# Patient Record
Sex: MALE | Race: WHITE | NOT HISPANIC OR LATINO | Employment: OTHER | URBAN - NONMETROPOLITAN AREA
[De-identification: names, ages, dates, MRNs, and addresses within clinical notes are randomized per-mention and may not be internally consistent; named-entity substitution may affect disease eponyms.]

---

## 2017-01-06 ENCOUNTER — OFFICE VISIT (OUTPATIENT)
Dept: CARDIOLOGY | Facility: PHYSICIAN GROUP | Age: 82
End: 2017-01-06
Payer: MEDICARE

## 2017-01-06 VITALS
DIASTOLIC BLOOD PRESSURE: 72 MMHG | SYSTOLIC BLOOD PRESSURE: 140 MMHG | BODY MASS INDEX: 31.28 KG/M2 | HEART RATE: 67 BPM | WEIGHT: 218 LBS | OXYGEN SATURATION: 94 %

## 2017-01-06 DIAGNOSIS — Z95.2 S/P AORTIC VALVE REPLACEMENT: ICD-10-CM

## 2017-01-06 DIAGNOSIS — I50.42 CHRONIC COMBINED SYSTOLIC AND DIASTOLIC HEART FAILURE (HCC): ICD-10-CM

## 2017-01-06 DIAGNOSIS — I48.92 PAROXYSMAL ATRIAL FLUTTER (HCC): ICD-10-CM

## 2017-01-06 PROCEDURE — G8599 NO ASA/ANTIPLAT THER USE RNG: HCPCS | Performed by: INTERNAL MEDICINE

## 2017-01-06 PROCEDURE — 99213 OFFICE O/P EST LOW 20 MIN: CPT | Performed by: INTERNAL MEDICINE

## 2017-01-06 PROCEDURE — 1036F TOBACCO NON-USER: CPT | Performed by: INTERNAL MEDICINE

## 2017-01-06 PROCEDURE — G8419 CALC BMI OUT NRM PARAM NOF/U: HCPCS | Performed by: INTERNAL MEDICINE

## 2017-01-06 PROCEDURE — G8427 DOCREV CUR MEDS BY ELIG CLIN: HCPCS | Performed by: INTERNAL MEDICINE

## 2017-01-06 ASSESSMENT — ENCOUNTER SYMPTOMS
MYALGIAS: 0
COUGH: 0
PND: 0
ORTHOPNEA: 0
FALLS: 0
WHEEZING: 0

## 2017-01-06 NOTE — PROGRESS NOTES
Subjective:   Dayne Franco is a 83 y.o. male who presents today for follow-up of his aortic valve disease.  Cardiac status is clinically stable since last interval.  No chest pain, palpitations, orthopnea, edema, syncope, or near-syncope.  Exertional capacity has been stable.  No interval change otherwise.  Past Medical History   Diagnosis Date   • PVD (peripheral vascular disease) (HCC)    • DVT      right   • Anemia, unspecified      associated with chronic elevation of sed rate, possibly due to the chronic inflamation of the R leg   • Aortic valve disorders      S/P AVR    • Atrial fibrillation (HCC)      S/P maze, period of SR post op then persistent atrial flutter.    • Atrial flutter (HCC)      persistent after maze procedure with spontaneous reversion to sinus rhythm with atypical P waves.   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   • Pure hypercholesterolemia    • Cellulitis and abscess of unspecified site      R leg post trauma and on chronic antibiotic supression     Past Surgical History   Procedure Laterality Date   • Aortic valve replacement  6/3/08     Performed by GHASSAN CLIFTON at SURGERY [facility] ORS   • Maze procedure  6/3/08     Performed by GHASSAN CLIFTON at SURGERY [facility] ORS   • Mass excision general  6/3/08     Performed by GHASSAN CLIFTON at SURGERY [facility] ORS   • Aortic valve replacement       bovine pericardial   • Foot surgery       Family History   Problem Relation Age of Onset   • Other Neg Hx      History   Smoking status   • Former Smoker   Smokeless tobacco   • Never Used     Allergies   Allergen Reactions   • Aggrenox [Aspirin-Dipyridamole]    • Plavix [Clopidogrel Bisulfate]      Outpatient Encounter Prescriptions as of 1/6/2017   Medication Sig Dispense Refill   • Misc Natural Products (GLUCOSAMINE CHOND COMPLEX/MSM PO) Take  by mouth.     • cefdinir (OMNICEF) 300 MG CAPS Take 300 mg by mouth every day.     • Nattokinase 100 MG CAPS  Take  by mouth every day.     • Multiple Vitamins-Minerals (OCUVITE) TABS Take 2 Tabs by mouth 2 times a day.     • docosahexanoic acid (OMEGA 3 FA) 1000 MG CAPS Take 1,000 mg by mouth 2 Times a Day.       No facility-administered encounter medications on file as of 1/6/2017.     Review of Systems   Respiratory: Negative for cough and wheezing.    Cardiovascular: Negative for orthopnea and PND.   Musculoskeletal: Negative for myalgias and falls.        Objective:   /72 mmHg  Pulse 67  Wt 98.884 kg (218 lb)  SpO2 94%    Physical Exam   Constitutional: He is oriented to person, place, and time. He appears well-developed and well-nourished.   Younger for his age.    Eyes: Conjunctivae are normal. No scleral icterus.   Neck: No JVD present.   Cardiovascular: Normal rate, regular rhythm, S1 normal and S2 normal.  Exam reveals no gallop.    Murmur (2/6 luis, left sternal border) heard.  Pulmonary/Chest: Effort normal and breath sounds normal.   Healed sternotomy   Musculoskeletal: He exhibits edema (1+ bilateral with support stockings on).   Neurological: He is alert and oriented to person, place, and time.   Skin: Skin is warm and dry.   Psychiatric: He has a normal mood and affect. Thought content normal.       Assessment:     1. S/P aortic valve replacement     2. Chronic combined systolic and diastolic heart failure (HCC)     3. Paroxysmal atrial flutter (HCC)        he is maintaining sinus rhythm or ectopic atrial rhythm post-maze. His congestive failure is now basically resolved and requires no regular therapy.   Medical Decision Making:  Today's Assessment / Status / Plan:     Continue the current cardiovascular regimen.  Continue primary follow up with Dr. Ortega  Cardiology follow up in  6 month(s) and  sooner if needed for any change.   Lab before next visit.  Use of the emergency medical system reviewed for any new symptoms.

## 2017-01-06 NOTE — Clinical Note
[facility] [address]  Phone: [phone]  Fax: [phone]              Dayne Franco  4/5/1933    Encounter Date: 1/6/2017    Jax Collins M.D.          PROGRESS NOTE:  Subjective:   Dayne Franco is a 83 y.o. male who presents today for follow-up of his aortic valve disease.  Cardiac status is clinically stable since last interval.  No chest pain, palpitations, orthopnea, edema, syncope, or near-syncope.  Exertional capacity has been stable.  No interval change otherwise.  Past Medical History   Diagnosis Date   • PVD (peripheral vascular disease) (HCC)    • DVT      right   • Anemia, unspecified      associated with chronic elevation of sed rate, possibly due to the chronic inflamation of the R leg   • Aortic valve disorders      S/P AVR    • Atrial fibrillation (HCC)      S/P maze, period of SR post op then persistent atrial flutter.    • Atrial flutter (HCC)      persistent after maze procedure with spontaneous reversion to sinus rhythm with atypical P waves.   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   • Pure hypercholesterolemia    • Cellulitis and abscess of unspecified site      R leg post trauma and on chronic antibiotic supression     Past Surgical History   Procedure Laterality Date   • Aortic valve replacement  6/3/08     Performed by GHASSAN CLIFTON at SURGERY [facility] ORS   • Maze procedure  6/3/08     Performed by GHASSAN CLIFTON at SURGERY [facility] ORS   • Mass excision general  6/3/08     Performed by GHASSAN CLIFTON at SURGERY [facility] ORS   • Aortic valve replacement       bovine pericardial   • Foot surgery       Family History   Problem Relation Age of Onset   • Other Neg Hx      History   Smoking status   • Former Smoker   Smokeless tobacco   • Never Used     Allergies   Allergen Reactions   • Aggrenox [Aspirin-Dipyridamole]    • Plavix [Clopidogrel  Bisulfate]      Outpatient Encounter Prescriptions as of 1/6/2017   Medication Sig Dispense Refill   • Misc Natural Products (GLUCOSAMINE CHOND COMPLEX/MSM PO) Take  by mouth.     • cefdinir (OMNICEF) 300 MG CAPS Take 300 mg by mouth every day.     • Nattokinase 100 MG CAPS Take  by mouth every day.     • Multiple Vitamins-Minerals (OCUVITE) TABS Take 2 Tabs by mouth 2 times a day.     • docosahexanoic acid (OMEGA 3 FA) 1000 MG CAPS Take 1,000 mg by mouth 2 Times a Day.       No facility-administered encounter medications on file as of 1/6/2017.     Review of Systems   Respiratory: Negative for cough and wheezing.    Cardiovascular: Negative for orthopnea and PND.   Musculoskeletal: Negative for myalgias and falls.        Objective:   /72 mmHg  Pulse 67  Wt 98.884 kg (218 lb)  SpO2 94%    Physical Exam   Constitutional: He is oriented to person, place, and time. He appears well-developed and well-nourished.   Younger for his age.    Eyes: Conjunctivae are normal. No scleral icterus.   Neck: No JVD present.   Cardiovascular: Normal rate, regular rhythm, S1 normal and S2 normal.  Exam reveals no gallop.    Murmur (2/6 luis, left sternal border) heard.  Pulmonary/Chest: Effort normal and breath sounds normal.   Healed sternotomy   Musculoskeletal: He exhibits edema (1+ bilateral with support stockings on).   Neurological: He is alert and oriented to person, place, and time.   Skin: Skin is warm and dry.   Psychiatric: He has a normal mood and affect. Thought content normal.       Assessment:     1. S/P aortic valve replacement     2. Chronic combined systolic and diastolic heart failure (HCC)     3. Paroxysmal atrial flutter (HCC)        he is maintaining sinus rhythm or ectopic atrial rhythm post-maze. His congestive failure is now basically resolved and requires no regular therapy.   Medical Decision Making:  Today's Assessment / Status / Plan:     Continue the current cardiovascular regimen.  Continue  primary follow up with Dr. Ortega  Cardiology follow up in  6 month(s) and  sooner if needed for any change.   Lab before next visit.  Use of the emergency medical system reviewed for any new symptoms.       No Recipients

## 2017-07-11 ENCOUNTER — OFFICE VISIT (OUTPATIENT)
Dept: CARDIOLOGY | Facility: PHYSICIAN GROUP | Age: 82
End: 2017-07-11
Payer: MEDICARE

## 2017-07-11 VITALS
SYSTOLIC BLOOD PRESSURE: 130 MMHG | BODY MASS INDEX: 30.92 KG/M2 | HEART RATE: 60 BPM | HEIGHT: 70 IN | WEIGHT: 216 LBS | DIASTOLIC BLOOD PRESSURE: 70 MMHG

## 2017-07-11 DIAGNOSIS — D64.9 ANEMIA, UNSPECIFIED TYPE: ICD-10-CM

## 2017-07-11 DIAGNOSIS — Z95.2 S/P AORTIC VALVE REPLACEMENT: ICD-10-CM

## 2017-07-11 DIAGNOSIS — I48.92 PAROXYSMAL ATRIAL FLUTTER (HCC): ICD-10-CM

## 2017-07-11 DIAGNOSIS — E78.00 PURE HYPERCHOLESTEROLEMIA: ICD-10-CM

## 2017-07-11 DIAGNOSIS — I50.42 CHRONIC COMBINED SYSTOLIC AND DIASTOLIC HEART FAILURE (HCC): ICD-10-CM

## 2017-07-11 PROCEDURE — 99214 OFFICE O/P EST MOD 30 MIN: CPT | Performed by: INTERNAL MEDICINE

## 2017-07-11 ASSESSMENT — ENCOUNTER SYMPTOMS
WHEEZING: 0
COUGH: 0
CHILLS: 0
FALLS: 0
WEAKNESS: 0
MYALGIAS: 0
BRUISES/BLEEDS EASILY: 0
PND: 0
FEVER: 0
ORTHOPNEA: 0

## 2017-07-11 ASSESSMENT — LIFESTYLE VARIABLES: SUBSTANCE_ABUSE: 0

## 2017-07-11 NOTE — PROGRESS NOTES
Subjective:   Dayne Franco is a 84 y.o. male who presents today for follow-up of valvular heart disease. Over the past several weeks he has noted more shortness of breath and lower extremity edema than usual area he has not had chest pain nor any palpitations nor syncope nor near syncope. The shortness of breath occurs with effort.    Past Medical History   Diagnosis Date   • PVD (peripheral vascular disease) (CMS-HCC)    • DVT      right   • Anemia, unspecified      associated with chronic elevation of sed rate, possibly due to the chronic inflamation of the R leg   • Aortic valve disorders      S/P AVR    • Atrial fibrillation (CMS-HCC)      S/P maze, period of SR post op then persistent atrial flutter.    • Atrial flutter (CMS-HCC)      persistent after maze procedure with spontaneous reversion to sinus rhythm with atypical P waves.   • Postmyocardial infarction syndrome (CMS-HCC)      Dressler's post op AVR   • Pure hypercholesterolemia    • Cellulitis and abscess of unspecified site      R leg post trauma and on chronic antibiotic supression     Past Surgical History   Procedure Laterality Date   • Aortic valve replacement  6/3/08     Performed by GHASSAN CLIFTON at SURGERY [facility] ORS   • Maze procedure  6/3/08     Performed by GHASSAN CLIFTON at SURGERY [facility] ORS   • Mass excision general  6/3/08     Performed by GHASSAN CLIFTON at SURGERY [facility] ORS   • Aortic valve replacement       bovine pericardial   • Foot surgery       Family History   Problem Relation Age of Onset   • Other Neg Hx      History   Smoking status   • Former Smoker   Smokeless tobacco   • Never Used     Allergies   Allergen Reactions   • Aggrenox [Aspirin-Dipyridamole]    • Plavix [Clopidogrel Bisulfate]      Outpatient Encounter Prescriptions as of 7/11/2017   Medication Sig Dispense Refill   • Misc Natural Products (GLUCOSAMINE CHOND COMPLEX/MSM PO) Take  by mouth.     • Multiple Vitamins-Minerals (OCUVITE) TABS Take  "2 Tabs by mouth 2 times a day.     • docosahexanoic acid (OMEGA 3 FA) 1000 MG CAPS Take 1,000 mg by mouth 2 Times a Day.     • cefdinir (OMNICEF) 300 MG CAPS Take 300 mg by mouth every day.     • Nattokinase 100 MG CAPS Take  by mouth every day.       No facility-administered encounter medications on file as of 7/11/2017.     Review of Systems   Constitutional: Negative for fever, chills and malaise/fatigue.   HENT: Negative for nosebleeds.    Respiratory: Negative for cough and wheezing.    Cardiovascular: Negative for orthopnea and PND.   Musculoskeletal: Negative for myalgias and falls.   Neurological: Negative for weakness.   Endo/Heme/Allergies: Does not bruise/bleed easily.   Psychiatric/Behavioral: Negative for substance abuse.        Objective:   /70 mmHg  Pulse 60  Ht 1.778 m (5' 10\")  Wt 97.977 kg (216 lb)  BMI 30.99 kg/m2    Physical Exam   Constitutional: He is oriented to person, place, and time. He appears well-developed and well-nourished.   Younger for his age.    Eyes: Conjunctivae are normal. No scleral icterus.   Neck: No JVD present.   Cardiovascular: Normal rate, regular rhythm, S1 normal and S2 normal.  Exam reveals no gallop.    Murmur (2/6 luis, left sternal border, 2 over 4 and diastolic blowing murmur which is new ) heard.  Pulmonary/Chest: Effort normal and breath sounds normal.   Healed sternotomy   Musculoskeletal: He exhibits edema (2+ bilateral with support stockings on and they may be a little stretched out and are certainly not tight).   Neurological: He is alert and oriented to person, place, and time.   Skin: Skin is warm and dry.   Psychiatric: He has a normal mood and affect. Thought content normal.       Assessment:     1. S/P aortic valve replacement  ECHOCARDIOGRAM COMP W/O CONT   2. Chronic combined systolic and diastolic heart failure (CMS-HCC)  BTYPE NATRIURETIC PEPTIDE   3. Paroxysmal atrial flutter (CMS-HCC)     4. Pure hypercholesterolemia  LIPID PROFILE    COMP " METABOLIC PANEL    TSH   5. Anemia, unspecified type  CBC WITH DIFFERENTIAL     He has had chronic lymphedema in his support stockings are not as well fitting as they have been because they are getting old but the dyspnea suggest that this may be congestive failure and the diastolic murmur is certainly clear today.  Medical Decision Making:  Today's Assessment / Status / Plan:   Echo and lab now in follow-up visit early next week in immediately if dyspnea worsens. His daughter is with him and she is very clear on the use of the emergency medical system as well.  He is reluctant to add medication but that is almost certainly going to be required.

## 2017-07-11 NOTE — Clinical Note
[facility] [address]  Phone: [phone]  Fax: [phone]              Dayne Franco  4/5/1933    Encounter Date: 7/11/2017    Jax Collins M.D.          PROGRESS NOTE:  Subjective:   Dayne Franco is a 84 y.o. male who presents today for follow-up of valvular heart disease. Over the past several weeks he has noted more shortness of breath and lower extremity edema than usual area he has not had chest pain nor any palpitations nor syncope nor near syncope. The shortness of breath occurs with effort.    Past Medical History   Diagnosis Date   • PVD (peripheral vascular disease) (CMS-HCC)    • DVT      right   • Anemia, unspecified      associated with chronic elevation of sed rate, possibly due to the chronic inflamation of the R leg   • Aortic valve disorders      S/P AVR    • Atrial fibrillation (CMS-HCC)      S/P maze, period of SR post op then persistent atrial flutter.    • Atrial flutter (CMS-HCC)      persistent after maze procedure with spontaneous reversion to sinus rhythm with atypical P waves.   • Postmyocardial infarction syndrome (CMS-HCC)      Dressler's post op AVR   • Pure hypercholesterolemia    • Cellulitis and abscess of unspecified site      R leg post trauma and on chronic antibiotic supression     Past Surgical History   Procedure Laterality Date   • Aortic valve replacement  6/3/08     Performed by GHASSAN CLIFTON at SURGERY [facility] ORS   • Maze procedure  6/3/08     Performed by GHASSAN CLIFTON at SURGERY [facility] ORS   • Mass excision general  6/3/08     Performed by GHASSAN CLIFTON at SURGERY [facility] ORS   • Aortic valve replacement       bovine pericardial   • Foot surgery       Family History   Problem Relation Age of Onset   • Other Neg Hx      History   Smoking status   • Former Smoker   Smokeless tobacco   • Never Used     Allergies   Allergen Reactions   • Aggrenox  "[Aspirin-Dipyridamole]    • Plavix [Clopidogrel Bisulfate]      Outpatient Encounter Prescriptions as of 7/11/2017   Medication Sig Dispense Refill   • Misc Natural Products (GLUCOSAMINE CHOND COMPLEX/MSM PO) Take  by mouth.     • Multiple Vitamins-Minerals (OCUVITE) TABS Take 2 Tabs by mouth 2 times a day.     • docosahexanoic acid (OMEGA 3 FA) 1000 MG CAPS Take 1,000 mg by mouth 2 Times a Day.     • cefdinir (OMNICEF) 300 MG CAPS Take 300 mg by mouth every day.     • Nattokinase 100 MG CAPS Take  by mouth every day.       No facility-administered encounter medications on file as of 7/11/2017.     Review of Systems   Constitutional: Negative for fever, chills and malaise/fatigue.   HENT: Negative for nosebleeds.    Respiratory: Negative for cough and wheezing.    Cardiovascular: Negative for orthopnea and PND.   Musculoskeletal: Negative for myalgias and falls.   Neurological: Negative for weakness.   Endo/Heme/Allergies: Does not bruise/bleed easily.   Psychiatric/Behavioral: Negative for substance abuse.        Objective:   /70 mmHg  Pulse 60  Ht 1.778 m (5' 10\")  Wt 97.977 kg (216 lb)  BMI 30.99 kg/m2    Physical Exam   Constitutional: He is oriented to person, place, and time. He appears well-developed and well-nourished.   Younger for his age.    Eyes: Conjunctivae are normal. No scleral icterus.   Neck: No JVD present.   Cardiovascular: Normal rate, regular rhythm, S1 normal and S2 normal.  Exam reveals no gallop.    Murmur (2/6 luis, left sternal border, 2 over 4 and diastolic blowing murmur which is new ) heard.  Pulmonary/Chest: Effort normal and breath sounds normal.   Healed sternotomy   Musculoskeletal: He exhibits edema (2+ bilateral with support stockings on and they may be a little stretched out and are certainly not tight).   Neurological: He is alert and oriented to person, place, and time.   Skin: Skin is warm and dry.   Psychiatric: He has a normal mood and affect. Thought content " normal.       Assessment:     1. S/P aortic valve replacement  ECHOCARDIOGRAM COMP W/O CONT   2. Chronic combined systolic and diastolic heart failure (CMS-HCC)  BTYPE NATRIURETIC PEPTIDE   3. Paroxysmal atrial flutter (CMS-HCC)     4. Pure hypercholesterolemia  LIPID PROFILE    COMP METABOLIC PANEL    TSH   5. Anemia, unspecified type  CBC WITH DIFFERENTIAL     He has had chronic lymphedema in his support stockings are not as well fitting as they have been because they are getting old but the dyspnea suggest that this may be congestive failure and the diastolic murmur is certainly clear today.  Medical Decision Making:  Today's Assessment / Status / Plan:   Echo and lab now in follow-up visit early next week in immediately if dyspnea worsens. His daughter is with him and she is very clear on the use of the emergency medical system as well.  He is reluctant to add medication but that is almost certainly going to be required.        No Recipients

## 2017-07-19 DIAGNOSIS — D64.9 ANEMIA, UNSPECIFIED TYPE: ICD-10-CM

## 2017-07-19 DIAGNOSIS — E78.00 PURE HYPERCHOLESTEROLEMIA: ICD-10-CM

## 2017-07-19 DIAGNOSIS — I50.42 CHRONIC COMBINED SYSTOLIC AND DIASTOLIC HEART FAILURE (HCC): ICD-10-CM

## 2017-07-27 ENCOUNTER — OFFICE VISIT (OUTPATIENT)
Dept: CARDIOLOGY | Facility: PHYSICIAN GROUP | Age: 82
End: 2017-07-27
Payer: MEDICARE

## 2017-07-27 VITALS
OXYGEN SATURATION: 93 % | WEIGHT: 212 LBS | SYSTOLIC BLOOD PRESSURE: 102 MMHG | HEART RATE: 56 BPM | BODY MASS INDEX: 30.35 KG/M2 | HEIGHT: 70 IN | DIASTOLIC BLOOD PRESSURE: 60 MMHG

## 2017-07-27 DIAGNOSIS — Z95.2 S/P AORTIC VALVE REPLACEMENT: ICD-10-CM

## 2017-07-27 DIAGNOSIS — I50.42 CHRONIC COMBINED SYSTOLIC AND DIASTOLIC HEART FAILURE (HCC): ICD-10-CM

## 2017-07-27 DIAGNOSIS — I48.92 PAROXYSMAL ATRIAL FLUTTER (HCC): ICD-10-CM

## 2017-07-27 DIAGNOSIS — E78.00 PURE HYPERCHOLESTEROLEMIA: ICD-10-CM

## 2017-07-27 PROCEDURE — 93000 ELECTROCARDIOGRAM COMPLETE: CPT | Performed by: INTERNAL MEDICINE

## 2017-07-27 PROCEDURE — 99213 OFFICE O/P EST LOW 20 MIN: CPT | Performed by: INTERNAL MEDICINE

## 2017-07-27 ASSESSMENT — ENCOUNTER SYMPTOMS
FALLS: 0
ORTHOPNEA: 0
PND: 0
MYALGIAS: 0
COUGH: 0
WHEEZING: 0

## 2017-07-27 NOTE — PROGRESS NOTES
Subjective:   Dayne Franco is a 84 y.o. male who presents today for follow-up of his valvular heart disease. He has felt well with no dyspnea and no syncope nor near syncope nor congestive symptoms. The echocardiogram showed gradual progression of valvular dysfunction but his left and his systolic function is actually better than previously. His rhythm throughout the entire echocardiogram appeared to be junctional with no atrial activity whatsoever.    Past Medical History   Diagnosis Date   • PVD (peripheral vascular disease) (CMS-HCC)    • DVT      right   • Anemia, unspecified      associated with chronic elevation of sed rate, possibly due to the chronic inflamation of the R leg   • Aortic valve disorders      S/P AVR    • Atrial fibrillation (CMS-HCC)      S/P maze, period of SR post op then persistent atrial flutter.    • Atrial flutter (CMS-HCC)      persistent after maze procedure with spontaneous reversion to sinus rhythm with atypical P waves.   • Postmyocardial infarction syndrome (CMS-HCC)      Dressler's post op AVR   • Pure hypercholesterolemia    • Cellulitis and abscess of unspecified site      R leg post trauma and on chronic antibiotic supression     Past Surgical History   Procedure Laterality Date   • Aortic valve replacement  6/3/08     Performed by GHASSAN CLIFTON at SURGERY [facility] ORS   • Maze procedure  6/3/08     Performed by GHASSAN CLIFTON at SURGERY [facility] ORS   • Mass excision general  6/3/08     Performed by GHASSAN CLIFTON at SURGERY [facility] ORS   • Aortic valve replacement       bovine pericardial   • Foot surgery       Family History   Problem Relation Age of Onset   • Other Neg Hx      History   Smoking status   • Former Smoker   Smokeless tobacco   • Never Used     Allergies   Allergen Reactions   • Aggrenox [Aspirin-Dipyridamole]    • Plavix [Clopidogrel Bisulfate]      Outpatient Encounter Prescriptions as of 7/27/2017   Medication Sig Dispense Refill   • Misc  "Natural Products (GLUCOSAMINE CHOND COMPLEX/MSM PO) Take  by mouth.     • Multiple Vitamins-Minerals (OCUVITE) TABS Take 2 Tabs by mouth 2 times a day.     • docosahexanoic acid (OMEGA 3 FA) 1000 MG CAPS Take 1,000 mg by mouth 2 Times a Day.     • cefdinir (OMNICEF) 300 MG CAPS Take 300 mg by mouth every day.     • Nattokinase 100 MG CAPS Take  by mouth every day.       No facility-administered encounter medications on file as of 7/27/2017.     Review of Systems   Respiratory: Negative for cough and wheezing.    Cardiovascular: Positive for leg swelling (his chronic lymphedema is unchanged). Negative for orthopnea and PND.   Musculoskeletal: Negative for myalgias and falls.        Objective:   /60 mmHg  Pulse 56  Ht 1.778 m (5' 10\")  Wt 96.163 kg (212 lb)  BMI 30.42 kg/m2  SpO2 93%    Physical Exam   Constitutional: He is oriented to person, place, and time. He appears well-developed and well-nourished.   Young for age.    Eyes: Conjunctivae are normal. No scleral icterus.   Neck: No JVD present.   Cardiovascular: Normal rate, regular rhythm, S1 normal and S2 normal.  Exam reveals no gallop.    Murmur (2/6 luis, left sternal border and  2/4 diastolic blowing murmur as noted last time) heard.  Pulmonary/Chest: Effort normal and breath sounds normal.   Healed sternotomy   Musculoskeletal: He exhibits edema (2+ bilateral with support stockings on and they may be a little stretched out and are certainly not tight).   Neurological: He is alert and oriented to person, place, and time.   Skin: Skin is warm and dry.   Psychiatric: He has a normal mood and affect. Thought content normal.       Assessment:     1. S/P aortic valve replacement     2. Chronic combined systolic and diastolic heart failure (CMS-HCC)     3. Paroxysmal atrial flutter (CMS-HCC)  EKG   4. Pure hypercholesterolemia       We discussed the findings today. He would not want to consider any sort of invasive evaluation and the echo actually " "supports this. His rhythm is junctional with possible very low amplitude slow flutter as the atrial background please had no syncope nor near syncope and is does not want to consider a pacemaker.  Medical Decision Making:  Today's Assessment / Status / Plan:   He and his daughter and I had a good discussion today and he is extremely better than he was last time. He understands that his five-year mortality risk is high to which he responded \"good.\"  I will see him in 4 months and I made no changes today. Immediate reevaluation if symptoms.    "

## 2017-08-02 DIAGNOSIS — Z95.2 S/P AORTIC VALVE REPLACEMENT: ICD-10-CM

## 2017-11-30 ENCOUNTER — OFFICE VISIT (OUTPATIENT)
Dept: CARDIOLOGY | Facility: PHYSICIAN GROUP | Age: 82
End: 2017-11-30
Payer: MEDICARE

## 2017-11-30 VITALS
SYSTOLIC BLOOD PRESSURE: 136 MMHG | DIASTOLIC BLOOD PRESSURE: 86 MMHG | OXYGEN SATURATION: 94 % | BODY MASS INDEX: 30.49 KG/M2 | HEART RATE: 64 BPM | HEIGHT: 70 IN | WEIGHT: 213 LBS

## 2017-11-30 DIAGNOSIS — I34.2 NON-RHEUMATIC MITRAL VALVE STENOSIS: ICD-10-CM

## 2017-11-30 DIAGNOSIS — I48.92 PAROXYSMAL ATRIAL FLUTTER (HCC): ICD-10-CM

## 2017-11-30 DIAGNOSIS — I50.30 (HFPEF) HEART FAILURE WITH PRESERVED EJECTION FRACTION (HCC): ICD-10-CM

## 2017-11-30 DIAGNOSIS — Z95.2 S/P AORTIC VALVE REPLACEMENT: ICD-10-CM

## 2017-11-30 DIAGNOSIS — I49.8 JUNCTIONAL RHYTHM: ICD-10-CM

## 2017-11-30 PROCEDURE — 99213 OFFICE O/P EST LOW 20 MIN: CPT | Performed by: INTERNAL MEDICINE

## 2017-11-30 ASSESSMENT — ENCOUNTER SYMPTOMS
FALLS: 0
WHEEZING: 0
ORTHOPNEA: 0
PND: 0
MYALGIAS: 0
NEUROLOGICAL NEGATIVE: 1
COUGH: 0
BRUISES/BLEEDS EASILY: 0

## 2017-11-30 ASSESSMENT — LIFESTYLE VARIABLES: SUBSTANCE_ABUSE: 0

## 2017-11-30 NOTE — LETTER
[facility] [address]  Phone: [phone]  Fax: [phone]              Dayne Franco  4/5/1933    Encounter Date: 11/30/2017    Jax Collins M.D.          PROGRESS NOTE:  Subjective:   Dayne Franco is a 84 y.o. male who presents today For follow-up visit of his valvular heart disease and atrial arrhythmias. He has clinically been doing extremely well with no cardiovascular symptoms. He has had no bleeding problems. He has had no syncope nor near syncope.  Past Medical History:   Diagnosis Date   • Anemia, unspecified     associated with chronic elevation of sed rate, possibly due to the chronic inflamation of the R leg   • Aortic valve disorders     S/P AVR    • Atrial fibrillation (CMS-HCC)     S/P maze, period of SR post op then persistent atrial flutter.    • Atrial flutter (CMS-HCC)     persistent after maze procedure with spontaneous reversion to sinus rhythm with atypical P waves.   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   • Pure hypercholesterolemia    • PVD (peripheral vascular disease) (CMS-HCC)      Past Surgical History:   Procedure Laterality Date   • AORTIC VALVE REPLACEMENT  6/3/08    Performed by GHASSAN CLIFTON at [facility]ERY [facility]S   • MAZE PROCEDURE  6/3/08    Performed by GHASSAN CLIFTON at [facility]ERY [facility]   • MASS EXCISION GENERAL  6/3/08    Performed by GHASSAN CLIFTON at [facility]ERY [facility]   • AORTIC VALVE REPLACEMENT      bovine pericardial   • FOOT SURGERY       Family History   Problem Relation Age of Onset   • Other Neg Hx      History   Smoking Status   • Former Smoker   Smokeless Tobacco   • Never  "Used     Allergies   Allergen Reactions   • Aggrenox [Aspirin-Dipyridamole]    • Plavix [Clopidogrel Bisulfate]      Outpatient Encounter Prescriptions as of 11/30/2017   Medication Sig Dispense Refill   • Misc Natural Products (GLUCOSAMINE CHOND COMPLEX/MSM PO) Take  by mouth.     • Multiple Vitamins-Minerals (OCUVITE) TABS Take 2 Tabs by mouth 2 times a day.     • docosahexanoic acid (OMEGA 3 FA) 1000 MG CAPS Take 1,000 mg by mouth 2 Times a Day.     • cefdinir (OMNICEF) 300 MG CAPS Take 300 mg by mouth every day.     • Nattokinase 100 MG CAPS Take  by mouth every day.       No facility-administered encounter medications on file as of 11/30/2017.      Review of Systems   HENT: Negative for nosebleeds.    Respiratory: Negative for cough and wheezing.    Cardiovascular: Negative for orthopnea and PND.   Musculoskeletal: Negative for falls and myalgias.   Neurological: Negative.    Endo/Heme/Allergies: Does not bruise/bleed easily.   Psychiatric/Behavioral: Negative for substance abuse.        Objective:   /86   Pulse 64   Ht 1.778 m (5' 10\")   Wt 96.6 kg (213 lb)   SpO2 94%   BMI 30.56 kg/m²      Physical Exam   Constitutional: He is oriented to person, place, and time. He appears well-developed and well-nourished.   Young for age.    Eyes: Conjunctivae are normal. No scleral icterus.   Neck: No JVD present.   Cardiovascular: Normal rate, regular rhythm, S1 normal and S2 normal.  Exam reveals no gallop.    Murmur (2/6 luis, left sternal border and  2/4 diastolic blowing murmur unchanged) heard.  Pulmonary/Chest: Effort normal and breath sounds normal.   Healed sternotomy   Musculoskeletal: He exhibits edema (2+ bilateral with support stockings ).   Neurological: He is alert and oriented to person, place, and time.   Skin: Skin is warm and dry.   Psychiatric: He has a normal mood and affect. Thought content normal.     His rhythm is clock regular today and I did not repeat an electrocardiogram today but is " probably still low amplitude atonic atrial activity of some type with a regular junctional rhythm which remains asymptomatic. The echo is also scanned. Left ventricular systolic function has improved.  Assessment:     1. S/P aortic valve replacement     2. (HFpEF) heart failure with preserved ejection fraction (CMS-HCC)     3. Junctional rhythm     4. Paroxysmal atrial flutter (CMS-HCC)     5. Non-rheumatic mitral valve stenosis       His strategy of nattokinase as opposed to Coumadin has been remarkably successful over the past 9 years. He is strongly opposed to Coumadin and his valvular heart disease mandates Coumadin as opposed to the NOACs.  His diastolic heart failure is remarkably well compensated and his ejection fraction was improved. Clinical status is otherwise stable.      Medical Decision Making:  Today's Assessment / Status / Plan:   Continue the current cardiovascular regimen.  Continue primary follow up with  Dr. Ortega.   Cardiology follow up in 3 months and  sooner if needed for any change.   Lab after next visit.  Use of the emergency medical system reviewed.       Abhijit Ortega M.D.  [address] [place] [id number]  VIA Facsimile: [phone]

## 2017-11-30 NOTE — PROGRESS NOTES
Subjective:   Dayne Franco is a 84 y.o. male who presents today For follow-up visit of his valvular heart disease and atrial arrhythmias. He has clinically been doing extremely well with no cardiovascular symptoms. He has had no bleeding problems. He has had no syncope nor near syncope.  Past Medical History:   Diagnosis Date   • Anemia, unspecified     associated with chronic elevation of sed rate, possibly due to the chronic inflamation of the R leg   • Aortic valve disorders     S/P AVR    • Atrial fibrillation (CMS-HCC)     S/P maze, period of SR post op then persistent atrial flutter.    • Atrial flutter (CMS-HCC)     persistent after maze procedure with spontaneous reversion to sinus rhythm with atypical P waves.   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   • Pure hypercholesterolemia    • PVD (peripheral vascular disease) (CMS-HCC)      Past Surgical History:   Procedure Laterality Date   • AORTIC VALVE REPLACEMENT  6/3/08    Performed by GHASSAN CLIFTON at SURGERY [facility]   • MAZE PROCEDURE  6/3/08    Performed by GHASSAN CLIFTON at SURGERY [facility]   • MASS EXCISION GENERAL  6/3/08    Performed by GHASSAN CLIFTON at SURGERY [facility]   • AORTIC VALVE REPLACEMENT      bovine pericardial   • FOOT SURGERY       Family History   Problem Relation Age of Onset   • Other Neg Hx      History   Smoking Status   • Former Smoker   Smokeless Tobacco   • Never Used     Allergies   Allergen Reactions   • Aggrenox [Aspirin-Dipyridamole]    • Plavix [Clopidogrel Bisulfate]      Outpatient Encounter Prescriptions as of 11/30/2017   Medication Sig Dispense Refill   • Misc Natural Products (GLUCOSAMINE CHOND COMPLEX/MSM PO) Take  by  "mouth.     • Multiple Vitamins-Minerals (OCUVITE) TABS Take 2 Tabs by mouth 2 times a day.     • docosahexanoic acid (OMEGA 3 FA) 1000 MG CAPS Take 1,000 mg by mouth 2 Times a Day.     • cefdinir (OMNICEF) 300 MG CAPS Take 300 mg by mouth every day.     • Nattokinase 100 MG CAPS Take  by mouth every day.       No facility-administered encounter medications on file as of 11/30/2017.      Review of Systems   HENT: Negative for nosebleeds.    Respiratory: Negative for cough and wheezing.    Cardiovascular: Negative for orthopnea and PND.   Musculoskeletal: Negative for falls and myalgias.   Neurological: Negative.    Endo/Heme/Allergies: Does not bruise/bleed easily.   Psychiatric/Behavioral: Negative for substance abuse.        Objective:   /86   Pulse 64   Ht 1.778 m (5' 10\")   Wt 96.6 kg (213 lb)   SpO2 94%   BMI 30.56 kg/m²     Physical Exam   Constitutional: He is oriented to person, place, and time. He appears well-developed and well-nourished.   Young for age.    Eyes: Conjunctivae are normal. No scleral icterus.   Neck: No JVD present.   Cardiovascular: Normal rate, regular rhythm, S1 normal and S2 normal.  Exam reveals no gallop.    Murmur (2/6 luis, left sternal border and  2/4 diastolic blowing murmur unchanged) heard.  Pulmonary/Chest: Effort normal and breath sounds normal.   Healed sternotomy   Musculoskeletal: He exhibits edema (2+ bilateral with support stockings ).   Neurological: He is alert and oriented to person, place, and time.   Skin: Skin is warm and dry.   Psychiatric: He has a normal mood and affect. Thought content normal.     His rhythm is clock regular today and I did not repeat an electrocardiogram today but is probably still low amplitude atonic atrial activity of some type with a regular junctional rhythm which remains asymptomatic. The echo is also scanned. Left ventricular systolic function has improved.  Assessment:     1. S/P aortic valve replacement     2. (HFpEF) heart " failure with preserved ejection fraction (CMS-HCC)     3. Junctional rhythm     4. Paroxysmal atrial flutter (CMS-HCC)     5. Non-rheumatic mitral valve stenosis       His strategy of nattokinase as opposed to Coumadin has been remarkably successful over the past 9 years. He is strongly opposed to Coumadin and his valvular heart disease mandates Coumadin as opposed to the NOACs.  His diastolic heart failure is remarkably well compensated and his ejection fraction was improved. Clinical status is otherwise stable.      Medical Decision Making:  Today's Assessment / Status / Plan:   Continue the current cardiovascular regimen.  Continue primary follow up with  Dr. Ortega.   Cardiology follow up in 3 months and  sooner if needed for any change.   Lab after next visit.  Use of the emergency medical system reviewed.

## 2017-12-19 ENCOUNTER — APPOINTMENT (RX ONLY)
Dept: URBAN - NONMETROPOLITAN AREA CLINIC 15 | Facility: CLINIC | Age: 82
Setting detail: DERMATOLOGY
End: 2017-12-19

## 2017-12-19 DIAGNOSIS — L81.4 OTHER MELANIN HYPERPIGMENTATION: ICD-10-CM

## 2017-12-19 DIAGNOSIS — L72.8 OTHER FOLLICULAR CYSTS OF THE SKIN AND SUBCUTANEOUS TISSUE: ICD-10-CM

## 2017-12-19 DIAGNOSIS — L57.0 ACTINIC KERATOSIS: ICD-10-CM

## 2017-12-19 DIAGNOSIS — L82.1 OTHER SEBORRHEIC KERATOSIS: ICD-10-CM

## 2017-12-19 PROBLEM — D48.5 NEOPLASM OF UNCERTAIN BEHAVIOR OF SKIN: Status: ACTIVE | Noted: 2017-12-19

## 2017-12-19 PROCEDURE — ? BIOPSY BY SHAVE METHOD

## 2017-12-19 PROCEDURE — 17003 DESTRUCT PREMALG LES 2-14: CPT

## 2017-12-19 PROCEDURE — 17000 DESTRUCT PREMALG LESION: CPT

## 2017-12-19 PROCEDURE — 11101: CPT

## 2017-12-19 PROCEDURE — ? BIOPSY BY PUNCH METHOD

## 2017-12-19 PROCEDURE — ? LIQUID NITROGEN

## 2017-12-19 PROCEDURE — 11100: CPT | Mod: 59

## 2017-12-19 PROCEDURE — 99202 OFFICE O/P NEW SF 15 MIN: CPT | Mod: 25

## 2017-12-19 PROCEDURE — ? COUNSELING

## 2017-12-19 ASSESSMENT — LOCATION DETAILED DESCRIPTION DERM
LOCATION DETAILED: LEFT SUPERIOR LATERAL MALAR CHEEK
LOCATION DETAILED: RIGHT CENTRAL ZYGOMA
LOCATION DETAILED: LEFT RADIAL DORSAL HAND
LOCATION DETAILED: LEFT SUPERIOR PARIETAL SCALP
LOCATION DETAILED: LEFT CENTRAL PARIETAL SCALP
LOCATION DETAILED: LEFT MEDIAL MALAR CHEEK
LOCATION DETAILED: LEFT SUPERIOR MEDIAL FOREHEAD
LOCATION DETAILED: LEFT SUPERIOR OCCIPITAL SCALP
LOCATION DETAILED: RIGHT CENTRAL LATERAL NECK
LOCATION DETAILED: LEFT SUPERIOR FOREHEAD
LOCATION DETAILED: LEFT SUPERIOR ANTERIOR NECK
LOCATION DETAILED: RIGHT FOREHEAD
LOCATION DETAILED: LEFT MEDIAL TEMPLE
LOCATION DETAILED: LEFT FOREHEAD
LOCATION DETAILED: LEFT LATERAL FOREHEAD
LOCATION DETAILED: RIGHT CLAVICULAR NECK
LOCATION DETAILED: LEFT MEDIAL FRONTAL SCALP

## 2017-12-19 ASSESSMENT — LOCATION SIMPLE DESCRIPTION DERM
LOCATION SIMPLE: LEFT SCALP
LOCATION SIMPLE: NECK
LOCATION SIMPLE: RIGHT FOREHEAD
LOCATION SIMPLE: LEFT HAND
LOCATION SIMPLE: LEFT FOREHEAD
LOCATION SIMPLE: SCALP
LOCATION SIMPLE: LEFT TEMPLE
LOCATION SIMPLE: LEFT ANTERIOR NECK
LOCATION SIMPLE: LEFT CHEEK
LOCATION SIMPLE: LEFT OCCIPITAL SCALP
LOCATION SIMPLE: RIGHT ANTERIOR NECK
LOCATION SIMPLE: RIGHT ZYGOMA

## 2017-12-19 ASSESSMENT — LOCATION ZONE DERM
LOCATION ZONE: HAND
LOCATION ZONE: NECK
LOCATION ZONE: FACE
LOCATION ZONE: SCALP

## 2017-12-19 NOTE — PROCEDURE: BIOPSY BY SHAVE METHOD
Curettage Text: The wound bed was treated with curettage after the biopsy was performed.
Lab Facility: 
Biopsy Type: H and E
Detail Level: Detailed
Dressing: bandage
Hemostasis: Electrocautery
Wound Care: Vaseline
Consent: Written consent was obtained and risks were reviewed including but not limited to scarring, infection, bleeding, scabbing, incomplete removal, nerve damage and allergy to anesthesia.
Bill For Surgical Tray: no
Post-Care Instructions: I reviewed with the patient in detail post-care instructions. Patient is to keep the biopsy site dry overnight, and then apply bacitracin twice daily until healed. Patient may apply hydrogen peroxide soaks to remove any crusting.
Electrodesiccation Text: The wound bed was treated with electrodesiccation after the biopsy was performed.
Size Of Lesion In Cm: 1.3
Cryotherapy Text: The wound bed was treated with cryotherapy after the biopsy was performed.
Type Of Destruction Used: Electrodesiccation
Silver Nitrate Text: The wound bed was treated with silver nitrate after the biopsy was performed.
Anesthesia Volume In Cc: 0.5
Additional Anesthesia Volume In Cc (Will Not Render If 0): 0
Notification Instructions: Patient will be notified of biopsy results. However, patient instructed to call the office if not contacted within 2 weeks.
Biopsy Method: Dermablade
Lab: [id number]
Electrodesiccation And Curettage Text: The wound bed was treated with electrodesiccation and curettage after the biopsy was performed.
Billing Type: Third-Party Bill
Anesthesia Type: 1% lidocaine with epinephrine
Size Of Lesion In Cm: 0.7

## 2017-12-19 NOTE — PROCEDURE: BIOPSY BY PUNCH METHOD
Punch Size In Mm: 4
Epidermal Sutures: 6-0 Vicryl
Render Post-Care Instructions In Note?: no
Post-Care Instructions: I reviewed with the patient in detail post-care instructions. Patient is to keep the biopsy site dry overnight, and then apply bacitracin twice daily until healed. Patient may apply hydrogen peroxide soaks to remove any crusting.
Additional Anesthesia Volume In Cc (Will Not Render If 0): 0
Wound Care: Vaseline
Hemostasis: None
Dressing: bandage
Number Of Epidermal Sutures (Optional): 5
Consent: Written consent was obtained and risks were reviewed including but not limited to scarring, infection, bleeding, scabbing, incomplete removal, nerve damage and allergy to anesthesia.
Suture Removal: 14 days
Biopsy Type: H and E
Lab: [id number]
Billing Type: Third-Party Bill
Anesthesia Volume In Cc: 0.5
Lab Facility: [id number]
Notification Instructions: Patient will be notified of biopsy results. However, patient instructed to call the office if not contacted within 2 weeks.
Patient Will Remove Sutures At Home?: Yes
X Depth Of Punch In Cm (Optional): 0.8
Detail Level: Detailed
Home Suture Removal Text: Patient was provided a home suture removal kit and will remove their sutures at home.  If they have any questions or difficulties they will call the office.
Anesthesia Type: 1% lidocaine with epinephrine
Size Of Lesion In Cm (Optional): 1.7

## 2017-12-19 NOTE — PROCEDURE: LIQUID NITROGEN
Post-Care Instructions: I reviewed with the patient in detail post-care instructions. Patient is to wear sunprotection, and avoid picking at any of the treated lesions. Pt may apply Vaseline to crusted or scabbing areas.
Render Post-Care Instructions In Note?: no
Detail Level: Detailed
Duration Of Freeze Thaw-Cycle (Seconds): 0
Consent: The patient's consent was obtained including but not limited to risks of crusting, scabbing, blistering, scarring, darker or lighter pigmentary change, recurrence, incomplete removal and infection.
Medical Necessity Clause: This procedure was medically necessary because the lesions that were treated were:
Medical Necessity Information: It is in your best interest to select a reason for this procedure from the list below. All of these items fulfill various CMS LCD requirements except the new and changing color options.

## 2018-01-17 ENCOUNTER — APPOINTMENT (RX ONLY)
Dept: URBAN - NONMETROPOLITAN AREA CLINIC 15 | Facility: CLINIC | Age: 83
Setting detail: DERMATOLOGY
End: 2018-01-17

## 2018-01-17 DIAGNOSIS — L57.0 ACTINIC KERATOSIS: ICD-10-CM

## 2018-01-17 PROBLEM — D04.39 CARCINOMA IN SITU OF SKIN OF OTHER PARTS OF FACE: Status: ACTIVE | Noted: 2018-01-17

## 2018-01-17 PROBLEM — C44.41 BASAL CELL CARCINOMA OF SKIN OF SCALP AND NECK: Status: ACTIVE | Noted: 2018-01-17

## 2018-01-17 PROCEDURE — ? EXCISION

## 2018-01-17 PROCEDURE — ? CURETTAGE AND DESTRUCTION

## 2018-01-17 PROCEDURE — 17003 DESTRUCT PREMALG LES 2-14: CPT

## 2018-01-17 PROCEDURE — 17000 DESTRUCT PREMALG LESION: CPT

## 2018-01-17 PROCEDURE — 17282 DSTR MAL LS F/E/E/N/L/M1.1-2: CPT | Mod: 59

## 2018-01-17 PROCEDURE — 11622 EXC S/N/H/F/G MAL+MRG 1.1-2: CPT | Mod: 59

## 2018-01-17 PROCEDURE — ? LIQUID NITROGEN

## 2018-01-17 ASSESSMENT — LOCATION DETAILED DESCRIPTION DERM
LOCATION DETAILED: RIGHT CENTRAL ZYGOMA
LOCATION DETAILED: RIGHT MEDIAL TEMPLE
LOCATION DETAILED: RIGHT MEDIAL ZYGOMA
LOCATION DETAILED: RIGHT INFERIOR FOREHEAD

## 2018-01-17 ASSESSMENT — LOCATION SIMPLE DESCRIPTION DERM
LOCATION SIMPLE: RIGHT ZYGOMA
LOCATION SIMPLE: RIGHT FOREHEAD
LOCATION SIMPLE: RIGHT TEMPLE

## 2018-01-17 ASSESSMENT — LOCATION ZONE DERM: LOCATION ZONE: FACE

## 2018-01-17 NOTE — PROCEDURE: CURETTAGE AND DESTRUCTION
Size Of Lesion After Curettage: 1.5
Medication Injected: 5-Fluorouracil
Detail Level: Detailed
Concentration (Mg/Ml Or Millions Of Plaque Forming Units/Cc): 0.01
Add Intralesional Injection: No
Body Location Override (Optional - Billing Will Still Be Based On Selected Body Map Location If Applicable): left superior medial malar cheek
Size Of Lesion In Cm: 1.3
Total Volume (Ccs): 1
Bill As A Line Item Or As Units: Line Item
Consent was obtained from the patient. The risks, benefits and alternatives to therapy were discussed in detail. Specifically, the risks of infection, scarring, bleeding, prolonged wound healing, nerve injury, incomplete removal, allergy to anesthesia and recurrence were addressed. Alternatives to ED&C, such as: surgical removal and XRT were also discussed.  Prior to the procedure, the treatment site was clearly identified and confirmed by the patient. All components of Universal Protocol/PAUSE Rule completed.
Anesthesia Type: 1% lidocaine with epinephrine
Additional Information: (Optional): The wound was cleaned, and a pressure dressing was applied.  The patient received detailed post-op instructions.
Cautery Type: electrodesiccation
Post-Care Instructions: I reviewed with the patient in detail post-care instructions. Patient is to keep the area dry for 48 hours, and not to engage in any swimming until the area is healed. Should the patient develop any fevers, chills, bleeding, severe pain patient will contact the office immediately.
Number Of Curettages: 3
What Was Performed First?: Curettage

## 2018-01-17 NOTE — PROCEDURE: EXCISION
Intermediate Repair Preamble Text (Leave Blank If You Do Not Want): Undermining was performed with blunt dissection.
Crescentic Complex Repair Preamble Text (Leave Blank If You Do Not Want): Extensive wide undermining was performed.
Repair Type: None
Lab: [id number]
Curettage Prior To Excision?: Yes
Secondary Defect Length (In Cm): 0
Billing Type: Third-Party Bill
Deep Sutures: 5-0 Vicryl
Additional Anesthesia Volume In Cc: 6
Graft Donor Site Bandage (Optional-Leave Blank If You Don't Want In Note): Steri-strips and a pressure bandage were applied to the donor site.
Epidermal Closure: simple interrupted
Estimated Blood Loss (Cc): minimal
Anesthesia Volume In Cc: 2
Excision Method: Saucerization
Hemostasis: Electrocautery
Dressing: dry sterile dressing
Scalpel Size: 15 blade
Pre-Excision Curettage Text (Leave Blank If You Do Not Want): Prior to drawing the surgical margin the visible lesion was removed with electrodesiccation and curettage to clearly define the lesion size.
Lab Facility: [id number]
Size Of Margin In Cm: 0.2
Excision Depth: adipose tissue
Size Of Lesion In Cm: 0.7
Bill For Surgical Tray: no
Wound Care: Petrolatum
Epidermal Sutures: 4-0 Ethilon
Detail Level: Detailed
Anesthesia Type: 1% lidocaine with epinephrine
Body Location Override (Optional - Billing Will Still Be Based On Selected Body Map Location If Applicable): left superior ant. neck
Positioning (Leave Blank If You Do Not Want): The patient was placed in a comfortable position exposing the surgical site.
Fusiform Excision Additional Text (Leave Blank If You Do Not Want): The margin was drawn around the clinically apparent lesion.  A fusiform shape was then drawn on the skin incorporating the lesion and margins.  Incisions were then made along these lines to the appropriate tissue plane and the lesion was extirpated.
Slit Excision Additional Text (Leave Blank If You Do Not Want): A linear line was drawn on the skin overlying the lesion. An incision was made slowly until the lesion was visualized.  Once visualized, the lesion was removed with blunt dissection.
Elliptical Excision Additional Text (Leave Blank If You Do Not Want): The margin was drawn around the clinically apparent lesion.  An elliptical shape was then drawn on the skin incorporating the lesion and margins.  Incisions were then made along these lines to the appropriate tissue plane and the lesion was extirpated.
Saucerization Excision Additional Text (Leave Blank If You Do Not Want): The margin was drawn around the clinically apparent lesion.  Incisions were then made along these lines, in a tangential fashion, to the appropriate tissue plane and the lesion was extirpated.
Perilesional Excision Additional Text (Leave Blank If You Do Not Want): The margin was drawn around the clinically apparent lesion. Incisions were then made along these lines to the appropriate tissue plane and the lesion was extirpated.
Excisional Biopsy Additional Text (Leave Blank If You Do Not Want): The margin was drawn around the clinically apparent lesion. An elliptical shape was then drawn on the skin incorporating the lesion and margins.  Incisions were then made along these lines to the appropriate tissue plane and the lesion was extirpated.
Curvilinear Excision Additional Text (Leave Blank If You Do Not Want): The margin was drawn around the clinically apparent lesion.  A curvilinear shape was then drawn on the skin incorporating the lesion and margins.  Incisions were then made along these lines to the appropriate tissue plane and the lesion was extirpated.
Complex Repair And Transposition Flap Text: The defect edges were debeveled with a #15 scalpel blade.  The primary defect was closed partially with a complex linear closure.  Given the location of the remaining defect, shape of the defect and the proximity to free margins a transposition flap was deemed most appropriate for complete closure of the defect.  Using a sterile surgical marker, an appropriate advancement flap was drawn incorporating the defect and placing the expected incisions within the relaxed skin tension lines where possible.    The area thus outlined was incised deep to adipose tissue with a #15 scalpel blade.  The skin margins were undermined to an appropriate distance in all directions utilizing iris scissors.
Repair Performed By Another Provider Text (Leave Blank If You Do Not Want): After the tissue was excised the defect was repaired by another provider.
Helical Rim Advancement Flap Text: The defect edges were debeveled with a #15 blade scalpel.  Given the location of the defect and the proximity to free margins (helical rim) a double helical rim advancement flap was deemed most appropriate.  Using a sterile surgical marker, the appropriate advancement flaps were drawn incorporating the defect and placing the expected incisions between the helical rim and antihelix where possible.  The area thus outlined was incised through and through with a #15 scalpel blade.  With a skin hook and iris scissors, the flaps were gently and sharply undermined and freed up.
Tissue Cultured Epidermal Autograft Text: The defect edges were debeveled with a #15 scalpel blade.  Given the location of the defect, shape of the defect and the proximity to free margins a tissue cultured epidermal autograft was deemed most appropriate.  The graft was then trimmed to fit the size of the defect.  The graft was then placed in the primary defect and oriented appropriately.
Consent was obtained from the patient. The risks and benefits to therapy were discussed in detail. Specifically, the risks of infection, scarring, bleeding, prolonged wound healing, incomplete removal, allergy to anesthesia, nerve injury and recurrence were addressed. Prior to the procedure, the treatment site was clearly identified and confirmed by the patient. All components of Universal Protocol/PAUSE Rule completed.
Posterior Auricular Interpolation Flap Text: A decision was made to reconstruct the defect utilizing an interpolation axial flap and a staged reconstruction.  A telfa template was made of the defect.  This telfa template was then used to outline the posterior auricular interpolation flap.  The donor area for the pedicle flap was then injected with anesthesia.  The flap was excised through the skin and subcutaneous tissue down to the layer of the underlying musculature.  The pedicle flap was carefully excised within this deep plane to maintain its blood supply.  The edges of the donor site were undermined.   The donor site was closed in a primary fashion.  The pedicle was then rotated into position and sutured.  Once the tube was sutured into place, adequate blood supply was confirmed with blanching and refill.  The pedicle was then wrapped with xeroform gauze and dressed appropriately with a telfa and gauze bandage to ensure continued blood supply and protect the attached pedicle.
Ear Star Wedge Flap Text: The defect edges were debeveled with a #15 blade scalpel.  Given the location of the defect and the proximity to free margins (helical rim) an ear star wedge flap was deemed most appropriate.  Using a sterile surgical marker, the appropriate flap was drawn incorporating the defect and placing the expected incisions between the helical rim and antihelix where possible.  The area thus outlined was incised through and through with a #15 scalpel blade.
Alar Island Pedicle Flap Text: The defect edges were debeveled with a #15 scalpel blade.  Given the location of the defect, shape of the defect and the proximity to the alar rim an island pedicle advancement flap was deemed most appropriate.  Using a sterile surgical marker, an appropriate advancement flap was drawn incorporating the defect, outlining the appropriate donor tissue and placing the expected incisions within the nasal ala running parallel to the alar rim. The area thus outlined was incised with a #15 scalpel blade.  The skin margins were undermined minimally to an appropriate distance in all directions around the primary defect and laterally outward around the island pedicle utilizing iris scissors.  There was minimal undermining beneath the pedicle flap.
Complex Repair And Rotation Flap Text: The defect edges were debeveled with a #15 scalpel blade.  The primary defect was closed partially with a complex linear closure.  Given the location of the remaining defect, shape of the defect and the proximity to free margins a rotation flap was deemed most appropriate for complete closure of the defect.  Using a sterile surgical marker, an appropriate advancement flap was drawn incorporating the defect and placing the expected incisions within the relaxed skin tension lines where possible.    The area thus outlined was incised deep to adipose tissue with a #15 scalpel blade.  The skin margins were undermined to an appropriate distance in all directions utilizing iris scissors.
Advancement Flap (Single) Text: The defect edges were debeveled with a #15 scalpel blade.  Given the location of the defect and the proximity to free margins a single advancement flap was deemed most appropriate.  Using a sterile surgical marker, an appropriate advancement flap was drawn incorporating the defect and placing the expected incisions within the relaxed skin tension lines where possible.    The area thus outlined was incised deep to adipose tissue with a #15 scalpel blade.  The skin margins were undermined to an appropriate distance in all directions utilizing iris scissors.
Complex Repair And Melolabial Flap Text: The defect edges were debeveled with a #15 scalpel blade.  The primary defect was closed partially with a complex linear closure.  Given the location of the remaining defect, shape of the defect and the proximity to free margins a melolabial flap was deemed most appropriate for complete closure of the defect.  Using a sterile surgical marker, an appropriate advancement flap was drawn incorporating the defect and placing the expected incisions within the relaxed skin tension lines where possible.    The area thus outlined was incised deep to adipose tissue with a #15 scalpel blade.  The skin margins were undermined to an appropriate distance in all directions utilizing iris scissors.
Island Pedicle Flap-Requiring Vessel Identification Text: The defect edges were debeveled with a #15 scalpel blade.  Given the location of the defect, shape of the defect and the proximity to free margins an island pedicle advancement flap was deemed most appropriate.  Using a sterile surgical marker, an appropriate advancement flap was drawn, based on the axial vessel mentioned above, incorporating the defect, outlining the appropriate donor tissue and placing the expected incisions within the relaxed skin tension lines where possible.    The area thus outlined was incised deep to adipose tissue with a #15 scalpel blade.  The skin margins were undermined to an appropriate distance in all directions around the primary defect and laterally outward around the island pedicle utilizing iris scissors.  There was minimal undermining beneath the pedicle flap.
V-Y Plasty Text: The defect edges were debeveled with a #15 scalpel blade.  Given the location of the defect, shape of the defect and the proximity to free margins an V-Y advancement flap was deemed most appropriate.  Using a sterile surgical marker, an appropriate advancement flap was drawn incorporating the defect and placing the expected incisions within the relaxed skin tension lines where possible.    The area thus outlined was incised deep to adipose tissue with a #15 scalpel blade.  The skin margins were undermined to an appropriate distance in all directions utilizing iris scissors.
Rhombic Flap Text: The defect edges were debeveled with a #15 scalpel blade.  Given the location of the defect and the proximity to free margins a rhombic flap was deemed most appropriate.  Using a sterile surgical marker, an appropriate rhombic flap was drawn incorporating the defect.    The area thus outlined was incised deep to adipose tissue with a #15 scalpel blade.  The skin margins were undermined to an appropriate distance in all directions utilizing iris scissors.
Home Suture Removal Text: Patient was provided a home suture removal kit and will remove their sutures at home.  If they have any questions or difficulties they will call the office.
Complex Repair And Ftsg Text: The defect edges were debeveled with a #15 scalpel blade.  The primary defect was closed partially with a complex linear closure.  Given the location of the defect, shape of the defect and the proximity to free margins a full thickness skin graft was deemed most appropriate to repair the remaining defect.  The graft was trimmed to fit the size of the remaining defect.  The graft was then placed in the primary defect, oriented appropriately, and sutured into place.
Dorsal Nasal Flap Text: The defect edges were debeveled with a #15 scalpel blade.  Given the location of the defect and the proximity to free margins a dorsal nasal flap was deemed most appropriate.  Using a sterile surgical marker, an appropriate dorsal nasal flap was drawn around the defect.    The area thus outlined was incised deep to adipose tissue with a #15 scalpel blade.  The skin margins were undermined to an appropriate distance in all directions utilizing iris scissors.
W Plasty Text: The lesion was extirpated to the level of the fat with a #15 scalpel blade.  Given the location of the defect, shape of the defect and the proximity to free margins a W-plasty was deemed most appropriate for repair.  Using a sterile surgical marker, the appropriate transposition arms of the W-plasty were drawn incorporating the defect and placing the expected incisions within the relaxed skin tension lines where possible.    The area thus outlined was incised deep to adipose tissue with a #15 scalpel blade.  The skin margins were undermined to an appropriate distance in all directions utilizing iris scissors.  The opposing transposition arms were then transposed into place in opposite direction and anchored with interrupted buried subcutaneous sutures.
Complex Repair And O-T Advancement Flap Text: The defect edges were debeveled with a #15 scalpel blade.  The primary defect was closed partially with a complex linear closure.  Given the location of the remaining defect, shape of the defect and the proximity to free margins an O-T advancement flap was deemed most appropriate for complete closure of the defect.  Using a sterile surgical marker, an appropriate advancement flap was drawn incorporating the defect and placing the expected incisions within the relaxed skin tension lines where possible.    The area thus outlined was incised deep to adipose tissue with a #15 scalpel blade.  The skin margins were undermined to an appropriate distance in all directions utilizing iris scissors.
Complex Repair And A-T Advancement Flap Text: The defect edges were debeveled with a #15 scalpel blade.  The primary defect was closed partially with a complex linear closure.  Given the location of the remaining defect, shape of the defect and the proximity to free margins an A-T advancement flap was deemed most appropriate for complete closure of the defect.  Using a sterile surgical marker, an appropriate advancement flap was drawn incorporating the defect and placing the expected incisions within the relaxed skin tension lines where possible.    The area thus outlined was incised deep to adipose tissue with a #15 scalpel blade.  The skin margins were undermined to an appropriate distance in all directions utilizing iris scissors.
Trilobed Flap Text: The defect edges were debeveled with a #15 scalpel blade.  Given the location of the defect and the proximity to free margins a trilobed flap was deemed most appropriate.  Using a sterile surgical marker, an appropriate trilobed flap drawn around the defect.    The area thus outlined was incised deep to adipose tissue with a #15 scalpel blade.  The skin margins were undermined to an appropriate distance in all directions utilizing iris scissors.
V-Y Flap Text: The defect edges were debeveled with a #15 scalpel blade.  Given the location of the defect, shape of the defect and the proximity to free margins a V-Y flap was deemed most appropriate.  Using a sterile surgical marker, an appropriate advancement flap was drawn incorporating the defect and placing the expected incisions within the relaxed skin tension lines where possible.    The area thus outlined was incised deep to adipose tissue with a #15 scalpel blade.  The skin margins were undermined to an appropriate distance in all directions utilizing iris scissors.
Split-Thickness Skin Graft Text: The defect edges were debeveled with a #15 scalpel blade.  Given the location of the defect, shape of the defect and the proximity to free margins a split thickness skin graft was deemed most appropriate.  Using a sterile surgical marker, the primary defect shape was transferred to the donor site. The split thickness graft was then harvested.  The skin graft was then placed in the primary defect and oriented appropriately.
Complex Repair And V-Y Plasty Text: The defect edges were debeveled with a #15 scalpel blade.  The primary defect was closed partially with a complex linear closure.  Given the location of the remaining defect, shape of the defect and the proximity to free margins a V-Y plasty was deemed most appropriate for complete closure of the defect.  Using a sterile surgical marker, an appropriate advancement flap was drawn incorporating the defect and placing the expected incisions within the relaxed skin tension lines where possible.    The area thus outlined was incised deep to adipose tissue with a #15 scalpel blade.  The skin margins were undermined to an appropriate distance in all directions utilizing iris scissors.
Muscle Hinge Flap Text: The defect edges were debeveled with a #15 scalpel blade.  Given the size, depth and location of the defect and the proximity to free margins a muscle hinge flap was deemed most appropriate.  Using a sterile surgical marker, an appropriate hinge flap was drawn incorporating the defect. The area thus outlined was incised with a #15 scalpel blade.  The skin margins were undermined to an appropriate distance in all directions utilizing iris scissors.
Complex Repair And Modified Advancement Flap Text: The defect edges were debeveled with a #15 scalpel blade.  The primary defect was closed partially with a complex linear closure.  Given the location of the remaining defect, shape of the defect and the proximity to free margins a modified advancement flap was deemed most appropriate for complete closure of the defect.  Using a sterile surgical marker, an appropriate advancement flap was drawn incorporating the defect and placing the expected incisions within the relaxed skin tension lines where possible.    The area thus outlined was incised deep to adipose tissue with a #15 scalpel blade.  The skin margins were undermined to an appropriate distance in all directions utilizing iris scissors.
Complex Repair And M Plasty Text: The defect edges were debeveled with a #15 scalpel blade.  The primary defect was closed partially with a complex linear closure.  Given the location of the remaining defect, shape of the defect and the proximity to free margins an M plasty was deemed most appropriate for complete closure of the defect.  Using a sterile surgical marker, an appropriate advancement flap was drawn incorporating the defect and placing the expected incisions within the relaxed skin tension lines where possible.    The area thus outlined was incised deep to adipose tissue with a #15 scalpel blade.  The skin margins were undermined to an appropriate distance in all directions utilizing iris scissors.
Xenograft Text: The defect edges were debeveled with a #15 scalpel blade.  Given the location of the defect, shape of the defect and the proximity to free margins a xenograft was deemed most appropriate.  The graft was then trimmed to fit the size of the defect.  The graft was then placed in the primary defect and oriented appropriately.
Cheek Interpolation Flap Text: A decision was made to reconstruct the defect utilizing an interpolation axial flap and a staged reconstruction.  A telfa template was made of the defect.  This telfa template was then used to outline the Cheek Interpolation flap.  The donor area for the pedicle flap was then injected with anesthesia.  The flap was excised through the skin and subcutaneous tissue down to the layer of the underlying musculature.  The interpolation flap was carefully excised within this deep plane to maintain its blood supply.  The edges of the donor site were undermined.   The donor site was closed in a primary fashion.  The pedicle was then rotated into position and sutured.  Once the tube was sutured into place, adequate blood supply was confirmed with blanching and refill.  The pedicle was then wrapped with xeroform gauze and dressed appropriately with a telfa and gauze bandage to ensure continued blood supply and protect the attached pedicle.
Complex Repair And Dermal Autograft Text: The defect edges were debeveled with a #15 scalpel blade.  The primary defect was closed partially with a complex linear closure.  Given the location of the defect, shape of the defect and the proximity to free margins an dermal autograft was deemed most appropriate to repair the remaining defect.  The graft was trimmed to fit the size of the remaining defect.  The graft was then placed in the primary defect, oriented appropriately, and sutured into place.
Complex Repair And Double Advancement Flap Text: The defect edges were debeveled with a #15 scalpel blade.  The primary defect was closed partially with a complex linear closure.  Given the location of the remaining defect, shape of the defect and the proximity to free margins a double advancement flap was deemed most appropriate for complete closure of the defect.  Using a sterile surgical marker, an appropriate advancement flap was drawn incorporating the defect and placing the expected incisions within the relaxed skin tension lines where possible.    The area thus outlined was incised deep to adipose tissue with a #15 scalpel blade.  The skin margins were undermined to an appropriate distance in all directions utilizing iris scissors.
Complex Repair And Tissue Cultured Epidermal Autograft Text: The defect edges were debeveled with a #15 scalpel blade.  The primary defect was closed partially with a complex linear closure.  Given the location of the defect, shape of the defect and the proximity to free margins an tissue cultured epidermal autograft was deemed most appropriate to repair the remaining defect.  The graft was trimmed to fit the size of the remaining defect.  The graft was then placed in the primary defect, oriented appropriately, and sutured into place.
Skin Substitute Text: The defect edges were debeveled with a #15 scalpel blade.  Given the location of the defect, shape of the defect and the proximity to free margins a skin substitute graft was deemed most appropriate.  The graft material was trimmed to fit the size of the defect. The graft was then placed in the primary defect and oriented appropriately.
Complex Repair And Dorsal Nasal Flap Text: The defect edges were debeveled with a #15 scalpel blade.  The primary defect was closed partially with a complex linear closure.  Given the location of the remaining defect, shape of the defect and the proximity to free margins a dorsal nasal flap was deemed most appropriate for complete closure of the defect.  Using a sterile surgical marker, an appropriate flap was drawn incorporating the defect and placing the expected incisions within the relaxed skin tension lines where possible.    The area thus outlined was incised deep to adipose tissue with a #15 scalpel blade.  The skin margins were undermined to an appropriate distance in all directions utilizing iris scissors.
Purse String (Simple) Text: Given the location of the defect and the characteristics of the surrounding skin a purse string simple closure was deemed most appropriate.  Undermining was performed circumferentially around the surgical defect.  A purse string suture was then placed and tightened.
Double Island Pedicle Flap Text: The defect edges were debeveled with a #15 scalpel blade.  Given the location of the defect, shape of the defect and the proximity to free margins a double island pedicle advancement flap was deemed most appropriate.  Using a sterile surgical marker, an appropriate advancement flap was drawn incorporating the defect, outlining the appropriate donor tissue and placing the expected incisions within the relaxed skin tension lines where possible.    The area thus outlined was incised deep to adipose tissue with a #15 scalpel blade.  The skin margins were undermined to an appropriate distance in all directions around the primary defect and laterally outward around the island pedicle utilizing iris scissors.  There was minimal undermining beneath the pedicle flap.
Star Wedge Flap Text: The defect edges were debeveled with a #15 scalpel blade.  Given the location of the defect, shape of the defect and the proximity to free margins a star wedge flap was deemed most appropriate.  Using a sterile surgical marker, an appropriate rotation flap was drawn incorporating the defect and placing the expected incisions within the relaxed skin tension lines where possible. The area thus outlined was incised deep to adipose tissue with a #15 scalpel blade.  The skin margins were undermined to an appropriate distance in all directions utilizing iris scissors.
Crescentic Advancement Flap Text: The defect edges were debeveled with a #15 scalpel blade.  Given the location of the defect and the proximity to free margins a crescentic advancement flap was deemed most appropriate.  Using a sterile surgical marker, the appropriate advancement flap was drawn incorporating the defect and placing the expected incisions within the relaxed skin tension lines where possible.    The area thus outlined was incised deep to adipose tissue with a #15 scalpel blade.  The skin margins were undermined to an appropriate distance in all directions utilizing iris scissors.
Bilobed Flap Text: The defect edges were debeveled with a #15 scalpel blade.  Given the location of the defect and the proximity to free margins a bilobe flap was deemed most appropriate.  Using a sterile surgical marker, an appropriate bilobe flap drawn around the defect.    The area thus outlined was incised deep to adipose tissue with a #15 scalpel blade.  The skin margins were undermined to an appropriate distance in all directions utilizing iris scissors.
Dermal Autograft Text: The defect edges were debeveled with a #15 scalpel blade.  Given the location of the defect, shape of the defect and the proximity to free margins a dermal autograft was deemed most appropriate.  Using a sterile surgical marker, the primary defect shape was transferred to the donor site. The area thus outlined was incised deep to adipose tissue with a #15 scalpel blade.  The harvested graft was then trimmed of adipose and epidermal tissue until only dermis was left.  The skin graft was then placed in the primary defect and oriented appropriately.
Complex Repair And W Plasty Text: The defect edges were debeveled with a #15 scalpel blade.  The primary defect was closed partially with a complex linear closure.  Given the location of the remaining defect, shape of the defect and the proximity to free margins a W plasty was deemed most appropriate for complete closure of the defect.  Using a sterile surgical marker, an appropriate advancement flap was drawn incorporating the defect and placing the expected incisions within the relaxed skin tension lines where possible.    The area thus outlined was incised deep to adipose tissue with a #15 scalpel blade.  The skin margins were undermined to an appropriate distance in all directions utilizing iris scissors.
Rotation Flap Text: The defect edges were debeveled with a #15 scalpel blade.  Given the location of the defect, shape of the defect and the proximity to free margins a rotation flap was deemed most appropriate.  Using a sterile surgical marker, an appropriate rotation flap was drawn incorporating the defect and placing the expected incisions within the relaxed skin tension lines where possible.    The area thus outlined was incised deep to adipose tissue with a #15 scalpel blade.  The skin margins were undermined to an appropriate distance in all directions utilizing iris scissors.
Partial Purse String (Intermediate) Text: Given the location of the defect and the characteristics of the surrounding skin an intermediate purse string closure was deemed most appropriate.  Undermining was performed circumferentially around the surgical defect.  A purse string suture was then placed and tightened. Wound tension of the circular defect prevented complete closure of the wound.
Hatchet Flap Text: The defect edges were debeveled with a #15 scalpel blade.  Given the location of the defect, shape of the defect and the proximity to free margins a hatchet flap was deemed most appropriate.  Using a sterile surgical marker, an appropriate hatchet flap was drawn incorporating the defect and placing the expected incisions within the relaxed skin tension lines where possible.    The area thus outlined was incised deep to adipose tissue with a #15 scalpel blade.  The skin margins were undermined to an appropriate distance in all directions utilizing iris scissors.
Island Pedicle Flap Text: The defect edges were debeveled with a #15 scalpel blade.  Given the location of the defect, shape of the defect and the proximity to free margins an island pedicle advancement flap was deemed most appropriate.  Using a sterile surgical marker, an appropriate advancement flap was drawn incorporating the defect, outlining the appropriate donor tissue and placing the expected incisions within the relaxed skin tension lines where possible.    The area thus outlined was incised deep to adipose tissue with a #15 scalpel blade.  The skin margins were undermined to an appropriate distance in all directions around the primary defect and laterally outward around the island pedicle utilizing iris scissors.  There was minimal undermining beneath the pedicle flap.
Z Plasty Text: The lesion was extirpated to the level of the fat with a #15 scalpel blade.  Given the location of the defect, shape of the defect and the proximity to free margins a Z-plasty was deemed most appropriate for repair.  Using a sterile surgical marker, the appropriate transposition arms of the Z-plasty were drawn incorporating the defect and placing the expected incisions within the relaxed skin tension lines where possible.    The area thus outlined was incised deep to adipose tissue with a #15 scalpel blade.  The skin margins were undermined to an appropriate distance in all directions utilizing iris scissors.  The opposing transposition arms were then transposed into place in opposite direction and anchored with interrupted buried subcutaneous sutures.
Complex Repair And Bilobe Flap Text: The defect edges were debeveled with a #15 scalpel blade.  The primary defect was closed partially with a complex linear closure.  Given the location of the remaining defect, shape of the defect and the proximity to free margins a bilobe flap was deemed most appropriate for complete closure of the defect.  Using a sterile surgical marker, an appropriate advancement flap was drawn incorporating the defect and placing the expected incisions within the relaxed skin tension lines where possible.    The area thus outlined was incised deep to adipose tissue with a #15 scalpel blade.  The skin margins were undermined to an appropriate distance in all directions utilizing iris scissors.
No Repair - Repaired With Adjacent Surgical Defect Text (Leave Blank If You Do Not Want): After the excision the defect was repaired concurrently with another surgical defect which was in close approximation.
Melolabial Interpolation Flap Text: A decision was made to reconstruct the defect utilizing an interpolation axial flap and a staged reconstruction.  A telfa template was made of the defect.  This telfa template was then used to outline the melolabial interpolation flap.  The donor area for the pedicle flap was then injected with anesthesia.  The flap was excised through the skin and subcutaneous tissue down to the layer of the underlying musculature.  The pedicle flap was carefully excised within this deep plane to maintain its blood supply.  The edges of the donor site were undermined.   The donor site was closed in a primary fashion.  The pedicle was then rotated into position and sutured.  Once the tube was sutured into place, adequate blood supply was confirmed with blanching and refill.  The pedicle was then wrapped with xeroform gauze and dressed appropriately with a telfa and gauze bandage to ensure continued blood supply and protect the attached pedicle.
Advancement Flap (Double) Text: The defect edges were debeveled with a #15 scalpel blade.  Given the location of the defect and the proximity to free margins a double advancement flap was deemed most appropriate.  Using a sterile surgical marker, the appropriate advancement flaps were drawn incorporating the defect and placing the expected incisions within the relaxed skin tension lines where possible.    The area thus outlined was incised deep to adipose tissue with a #15 scalpel blade.  The skin margins were undermined to an appropriate distance in all directions utilizing iris scissors.
Complex Repair And Z Plasty Text: The defect edges were debeveled with a #15 scalpel blade.  The primary defect was closed partially with a complex linear closure.  Given the location of the remaining defect, shape of the defect and the proximity to free margins a Z plasty was deemed most appropriate for complete closure of the defect.  Using a sterile surgical marker, an appropriate advancement flap was drawn incorporating the defect and placing the expected incisions within the relaxed skin tension lines where possible.    The area thus outlined was incised deep to adipose tissue with a #15 scalpel blade.  The skin margins were undermined to an appropriate distance in all directions utilizing iris scissors.
Cartilage Graft Text: The defect edges were debeveled with a #15 scalpel blade.  Given the location of the defect, shape of the defect, the fact the defect involved a full thickness cartilage defect a cartilage graft was deemed most appropriate.  An appropriate donor site was identified, cleansed, and anesthetized. The cartilage graft was then harvested and transferred to the recipient site, oriented appropriately and then sutured into place.  The secondary defect was then repaired using a primary closure.
Mucosal Advancement Flap Text: Given the location of the defect, shape of the defect and the proximity to free margins a mucosal advancement flap was deemed most appropriate. Incisions were made with a 15 blade scalpel in the appropriate fashion along the cutaneous vermilion border and the mucosal lip. The remaining actinically damaged mucosal tissue was excised.  The mucosal advancement flap was then elevated to the gingival sulcus with care taken to preserve the neurovascular structures and advanced into the primary defect. Care was taken to ensure that precise realignment of the vermilion border was achieved.
Cheek-To-Nose Interpolation Flap Text: A decision was made to reconstruct the defect utilizing an interpolation axial flap and a staged reconstruction.  A telfa template was made of the defect.  This telfa template was then used to outline the Cheek-To-Nose Interpolation flap.  The donor area for the pedicle flap was then injected with anesthesia.  The flap was excised through the skin and subcutaneous tissue down to the layer of the underlying musculature.  The interpolation flap was carefully excised within this deep plane to maintain its blood supply.  The edges of the donor site were undermined.   The donor site was closed in a primary fashion.  The pedicle was then rotated into position and sutured.  Once the tube was sutured into place, adequate blood supply was confirmed with blanching and refill.  The pedicle was then wrapped with xeroform gauze and dressed appropriately with a telfa and gauze bandage to ensure continued blood supply and protect the attached pedicle.
Advancement-Rotation Flap Text: The defect edges were debeveled with a #15 scalpel blade.  Given the location of the defect, shape of the defect and the proximity to free margins an advancement-rotation flap was deemed most appropriate.  Using a sterile surgical marker, an appropriate flap was drawn incorporating the defect and placing the expected incisions within the relaxed skin tension lines where possible. The area thus outlined was incised deep to adipose tissue with a #15 scalpel blade.  The skin margins were undermined to an appropriate distance in all directions utilizing iris scissors.
Bilateral Helical Rim Advancement Flap Text: The defect edges were debeveled with a #15 blade scalpel.  Given the location of the defect and the proximity to free margins (helical rim) a bilateral helical rim advancement flap was deemed most appropriate.  Using a sterile surgical marker, the appropriate advancement flaps were drawn incorporating the defect and placing the expected incisions between the helical rim and antihelix where possible.  The area thus outlined was incised through and through with a #15 scalpel blade.  With a skin hook and iris scissors, the flaps were gently and sharply undermined and freed up.
O-Z Plasty Text: The defect edges were debeveled with a #15 scalpel blade.  Given the location of the defect, shape of the defect and the proximity to free margins an O-Z plasty (double transposition flap) was deemed most appropriate.  Using a sterile surgical marker, the appropriate transposition flaps were drawn incorporating the defect and placing the expected incisions within the relaxed skin tension lines where possible.    The area thus outlined was incised deep to adipose tissue with a #15 scalpel blade.  The skin margins were undermined to an appropriate distance in all directions utilizing iris scissors.  Hemostasis was achieved with electrocautery.  The flaps were then transposed into place, one clockwise and the other counterclockwise, and anchored with interrupted buried subcutaneous sutures.
Complex Repair And O-L Flap Text: The defect edges were debeveled with a #15 scalpel blade.  The primary defect was closed partially with a complex linear closure.  Given the location of the remaining defect, shape of the defect and the proximity to free margins an O-L flap was deemed most appropriate for complete closure of the defect.  Using a sterile surgical marker, an appropriate flap was drawn incorporating the defect and placing the expected incisions within the relaxed skin tension lines where possible.    The area thus outlined was incised deep to adipose tissue with a #15 scalpel blade.  The skin margins were undermined to an appropriate distance in all directions utilizing iris scissors.
O-T Advancement Flap Text: The defect edges were debeveled with a #15 scalpel blade.  Given the location of the defect, shape of the defect and the proximity to free margins an O-T advancement flap was deemed most appropriate.  Using a sterile surgical marker, an appropriate advancement flap was drawn incorporating the defect and placing the expected incisions within the relaxed skin tension lines where possible.    The area thus outlined was incised deep to adipose tissue with a #15 scalpel blade.  The skin margins were undermined to an appropriate distance in all directions utilizing iris scissors.
Purse String (Intermediate) Text: Given the location of the defect and the characteristics of the surrounding skin a purse string intermediate closure was deemed most appropriate.  Undermining was performed circumfirentially around the surgical defect.  A purse string suture was then placed and tightened.
Path Notes (To The Dermatopathologist): Please check margins.
Lip Wedge Excision Repair Text: Given the location of the defect and the proximity to free margins a full thickness wedge repair was deemed most appropriate.  Using a sterile surgical marker, the appropriate repair was drawn incorporating the defect and placing the expected incisions perpendicular to the vermilion border.  The vermilion border was also meticulously outlined to ensure appropriate reapproximation during the repair.  The area thus outlined was incised through and through with a #15 scalpel blade.  The muscularis and dermis were reaproximated with deep sutures following hemostasis. Care was taken to realign the vermilion border before proceeding with the superficial closure.  Once the vermilion was realigned the superfical and mucosal closure was finished.
Bi-Rhombic Flap Text: The defect edges were debeveled with a #15 scalpel blade.  Given the location of the defect and the proximity to free margins a bi-rhombic flap was deemed most appropriate.  Using a sterile surgical marker, an appropriate rhombic flap was drawn incorporating the defect. The area thus outlined was incised deep to adipose tissue with a #15 scalpel blade.  The skin margins were undermined to an appropriate distance in all directions utilizing iris scissors.
Melolabial Transposition Flap Text: The defect edges were debeveled with a #15 scalpel blade.  Given the location of the defect and the proximity to free margins a melolabial flap was deemed most appropriate.  Using a sterile surgical marker, an appropriate melolabial transposition flap was drawn incorporating the defect.    The area thus outlined was incised deep to adipose tissue with a #15 scalpel blade.  The skin margins were undermined to an appropriate distance in all directions utilizing iris scissors.
Mastoid Interpolation Flap Text: A decision was made to reconstruct the defect utilizing an interpolation axial flap and a staged reconstruction.  A telfa template was made of the defect.  This telfa template was then used to outline the mastoid interpolation flap.  The donor area for the pedicle flap was then injected with anesthesia.  The flap was excised through the skin and subcutaneous tissue down to the layer of the underlying musculature.  The pedicle flap was carefully excised within this deep plane to maintain its blood supply.  The edges of the donor site were undermined.   The donor site was closed in a primary fashion.  The pedicle was then rotated into position and sutured.  Once the tube was sutured into place, adequate blood supply was confirmed with blanching and refill.  The pedicle was then wrapped with xeroform gauze and dressed appropriately with a telfa and gauze bandage to ensure continued blood supply and protect the attached pedicle.
Complex Repair And Split-Thickness Skin Graft Text: The defect edges were debeveled with a #15 scalpel blade.  The primary defect was closed partially with a complex linear closure.  Given the location of the defect, shape of the defect and the proximity to free margins a split thickness skin graft was deemed most appropriate to repair the remaining defect.  The graft was trimmed to fit the size of the remaining defect.  The graft was then placed in the primary defect, oriented appropriately, and sutured into place.
Epidermal Autograft Text: The defect edges were debeveled with a #15 scalpel blade.  Given the location of the defect, shape of the defect and the proximity to free margins an epidermal autograft was deemed most appropriate.  Using a sterile surgical marker, the primary defect shape was transferred to the donor site. The epidermal graft was then harvested.  The skin graft was then placed in the primary defect and oriented appropriately.
Interpolation Flap Text: A decision was made to reconstruct the defect utilizing an interpolation axial flap and a staged reconstruction.  A telfa template was made of the defect.  This telfa template was then used to outline the interpolation flap.  The donor area for the pedicle flap was then injected with anesthesia.  The flap was excised through the skin and subcutaneous tissue down to the layer of the underlying musculature.  The interpolation flap was carefully excised within this deep plane to maintain its blood supply.  The edges of the donor site were undermined.   The donor site was closed in a primary fashion.  The pedicle was then rotated into position and sutured.  Once the tube was sutured into place, adequate blood supply was confirmed with blanching and refill.  The pedicle was then wrapped with xeroform gauze and dressed appropriately with a telfa and gauze bandage to ensure continued blood supply and protect the attached pedicle.
O-L Flap Text: The defect edges were debeveled with a #15 scalpel blade.  Given the location of the defect, shape of the defect and the proximity to free margins an O-L flap was deemed most appropriate.  Using a sterile surgical marker, an appropriate advancement flap was drawn incorporating the defect and placing the expected incisions within the relaxed skin tension lines where possible.    The area thus outlined was incised deep to adipose tissue with a #15 scalpel blade.  The skin margins were undermined to an appropriate distance in all directions utilizing iris scissors.
Complex Repair And Epidermal Autograft Text: The defect edges were debeveled with a #15 scalpel blade.  The primary defect was closed partially with a complex linear closure.  Given the location of the defect, shape of the defect and the proximity to free margins an epidermal autograft was deemed most appropriate to repair the remaining defect.  The graft was trimmed to fit the size of the remaining defect.  The graft was then placed in the primary defect, oriented appropriately, and sutured into place.
O-T Plasty Text: The defect edges were debeveled with a #15 scalpel blade.  Given the location of the defect, shape of the defect and the proximity to free margins an O-T plasty was deemed most appropriate.  Using a sterile surgical marker, an appropriate O-T plasty was drawn incorporating the defect and placing the expected incisions within the relaxed skin tension lines where possible.    The area thus outlined was incised deep to adipose tissue with a #15 scalpel blade.  The skin margins were undermined to an appropriate distance in all directions utilizing iris scissors.
Island Pedicle Flap With Canthal Suspension Text: The defect edges were debeveled with a #15 scalpel blade.  Given the location of the defect, shape of the defect and the proximity to free margins an island pedicle advancement flap was deemed most appropriate.  Using a sterile surgical marker, an appropriate advancement flap was drawn incorporating the defect, outlining the appropriate donor tissue and placing the expected incisions within the relaxed skin tension lines where possible. The area thus outlined was incised deep to adipose tissue with a #15 scalpel blade.  The skin margins were undermined to an appropriate distance in all directions around the primary defect and laterally outward around the island pedicle utilizing iris scissors.  There was minimal undermining beneath the pedicle flap. A suspension suture was placed in the canthal tendon to prevent tension and prevent ectropion.
A-T Advancement Flap Text: The defect edges were debeveled with a #15 scalpel blade.  Given the location of the defect, shape of the defect and the proximity to free margins an A-T advancement flap was deemed most appropriate.  Using a sterile surgical marker, an appropriate advancement flap was drawn incorporating the defect and placing the expected incisions within the relaxed skin tension lines where possible.    The area thus outlined was incised deep to adipose tissue with a #15 scalpel blade.  The skin margins were undermined to an appropriate distance in all directions utilizing iris scissors.
Transposition Flap Text: The defect edges were debeveled with a #15 scalpel blade.  Given the location of the defect and the proximity to free margins a transposition flap was deemed most appropriate.  Using a sterile surgical marker, an appropriate transposition flap was drawn incorporating the defect.    The area thus outlined was incised deep to adipose tissue with a #15 scalpel blade.  The skin margins were undermined to an appropriate distance in all directions utilizing iris scissors.
S Plasty Text: Given the location and shape of the defect, and the orientation of relaxed skin tension lines, an S-plasty was deemed most appropriate for repair.  Using a sterile surgical marker, the appropriate outline of the S-plasty was drawn, incorporating the defect and placing the expected incisions within the relaxed skin tension lines where possible.  The area thus outlined was incised deep to adipose tissue with a #15 scalpel blade.  The skin margins were undermined to an appropriate distance in all directions utilizing iris scissors. The skin flaps were advanced over the defect.  The opposing margins were then approximated with interrupted buried subcutaneous sutures.
Post-Care Instructions: I reviewed with the patient in detail post-care instructions. Patient is not to engage in any heavy lifting, exercise, or swimming for the next 14 days. Should the patient develop any fevers, chills, bleeding, severe pain patient will contact the office immediately.
Complex Repair And Rhombic Flap Text: The defect edges were debeveled with a #15 scalpel blade.  The primary defect was closed partially with a complex linear closure.  Given the location of the remaining defect, shape of the defect and the proximity to free margins a rhombic flap was deemed most appropriate for complete closure of the defect.  Using a sterile surgical marker, an appropriate advancement flap was drawn incorporating the defect and placing the expected incisions within the relaxed skin tension lines where possible.    The area thus outlined was incised deep to adipose tissue with a #15 scalpel blade.  The skin margins were undermined to an appropriate distance in all directions utilizing iris scissors.
Modified Advancement Flap Text: The defect edges were debeveled with a #15 scalpel blade.  Given the location of the defect, shape of the defect and the proximity to free margins a modified advancement flap was deemed most appropriate.  Using a sterile surgical marker, an appropriate advancement flap was drawn incorporating the defect and placing the expected incisions within the relaxed skin tension lines where possible.    The area thus outlined was incised deep to adipose tissue with a #15 scalpel blade.  The skin margins were undermined to an appropriate distance in all directions utilizing iris scissors.
Complex Repair And Xenograft Text: The defect edges were debeveled with a #15 scalpel blade.  The primary defect was closed partially with a complex linear closure.  Given the location of the defect, shape of the defect and the proximity to free margins a xenograft was deemed most appropriate to repair the remaining defect.  The graft was trimmed to fit the size of the remaining defect.  The graft was then placed in the primary defect, oriented appropriately, and sutured into place.
Partial Purse String (Simple) Text: Given the location of the defect and the characteristics of the surrounding skin a simple purse string closure was deemed most appropriate.  Undermining was performed circumferentially around the surgical defect.  A purse string suture was then placed and tightened. Wound tension of the circular defect prevented complete closure of the wound.
Complex Repair And Double M Plasty Text: The defect edges were debeveled with a #15 scalpel blade.  The primary defect was closed partially with a complex linear closure.  Given the location of the remaining defect, shape of the defect and the proximity to free margins a double M plasty was deemed most appropriate for complete closure of the defect.  Using a sterile surgical marker, an appropriate advancement flap was drawn incorporating the defect and placing the expected incisions within the relaxed skin tension lines where possible.    The area thus outlined was incised deep to adipose tissue with a #15 scalpel blade.  The skin margins were undermined to an appropriate distance in all directions utilizing iris scissors.
Complex Repair And Skin Substitute Graft Text: The defect edges were debeveled with a #15 scalpel blade.  The primary defect was closed partially with a complex linear closure.  Given the location of the remaining defect, shape of the defect and the proximity to free margins a skin substitute graft was deemed most appropriate to repair the remaining defect.  The graft was trimmed to fit the size of the remaining defect.  The graft was then placed in the primary defect, oriented appropriately, and sutured into place.
Paramedian Forehead Flap Text: A decision was made to reconstruct the defect utilizing an interpolation axial flap and a staged reconstruction.  A telfa template was made of the defect.  This telfa template was then used to outline the paramedian forehead pedicle flap.  The donor area for the pedicle flap was then injected with anesthesia.  The flap was excised through the skin and subcutaneous tissue down to the layer of the underlying musculature.  The pedicle flap was carefully excised within this deep plane to maintain its blood supply.  The edges of the donor site were undermined.   The donor site was closed in a primary fashion.  The pedicle was then rotated into position and sutured.  Once the tube was sutured into place, adequate blood supply was confirmed with blanching and refill.  The pedicle was then wrapped with xeroform gauze and dressed appropriately with a telfa and gauze bandage to ensure continued blood supply and protect the attached pedicle.
Spiral Flap Text: The defect edges were debeveled with a #15 scalpel blade.  Given the location of the defect, shape of the defect and the proximity to free margins a spiral flap was deemed most appropriate.  Using a sterile surgical marker, an appropriate rotation flap was drawn incorporating the defect and placing the expected incisions within the relaxed skin tension lines where possible. The area thus outlined was incised deep to adipose tissue with a #15 scalpel blade.  The skin margins were undermined to an appropriate distance in all directions utilizing iris scissors.
Bilobed Transposition Flap Text: The defect edges were debeveled with a #15 scalpel blade.  Given the location of the defect and the proximity to free margins a bilobed transposition flap was deemed most appropriate.  Using a sterile surgical marker, an appropriate bilobe flap drawn around the defect.    The area thus outlined was incised deep to adipose tissue with a #15 scalpel blade.  The skin margins were undermined to an appropriate distance in all directions utilizing iris scissors.
H Plasty Text: Given the location of the defect, shape of the defect and the proximity to free margins a H-plasty was deemed most appropriate for repair.  Using a sterile surgical marker, the appropriate advancement arms of the H-plasty were drawn incorporating the defect and placing the expected incisions within the relaxed skin tension lines where possible. The area thus outlined was incised deep to adipose tissue with a #15 scalpel blade. The skin margins were undermined to an appropriate distance in all directions utilizing iris scissors.  The opposing advancement arms were then advanced into place in opposite direction and anchored with interrupted buried subcutaneous sutures.
Composite Graft Text: The defect edges were debeveled with a #15 scalpel blade.  Given the location of the defect, shape of the defect, the proximity to free margins and the fact the defect was full thickness a composite graft was deemed most appropriate.  The defect was outline and then transferred to the donor site.  A full thickness graft was then excised from the donor site. The graft was then placed in the primary defect, oriented appropriately and then sutured into place.  The secondary defect was then repaired using a primary closure.
Complex Repair And Single Advancement Flap Text: The defect edges were debeveled with a #15 scalpel blade.  The primary defect was closed partially with a complex linear closure.  Given the location of the remaining defect, shape of the defect and the proximity to free margins a single advancement flap was deemed most appropriate for complete closure of the defect.  Using a sterile surgical marker, an appropriate advancement flap was drawn incorporating the defect and placing the expected incisions within the relaxed skin tension lines where possible.    The area thus outlined was incised deep to adipose tissue with a #15 scalpel blade.  The skin margins were undermined to an appropriate distance in all directions utilizing iris scissors.
Keystone Flap Text: The defect edges were debeveled with a #15 scalpel blade.  Given the location of the defect, shape of the defect a keystone flap was deemed most appropriate.  Using a sterile surgical marker, an appropriate keystone flap was drawn incorporating the defect, outlining the appropriate donor tissue and placing the expected incisions within the relaxed skin tension lines where possible. The area thus outlined was incised deep to adipose tissue with a #15 scalpel blade.  The skin margins were undermined to an appropriate distance in all directions around the primary defect and laterally outward around the flap utilizing iris scissors.
Burow's Advancement Flap Text: The defect edges were debeveled with a #15 scalpel blade.  Given the location of the defect and the proximity to free margins a Burow's advancement flap was deemed most appropriate.  Using a sterile surgical marker, the appropriate advancement flap was drawn incorporating the defect and placing the expected incisions within the relaxed skin tension lines where possible.    The area thus outlined was incised deep to adipose tissue with a #15 scalpel blade.  The skin margins were undermined to an appropriate distance in all directions utilizing iris scissors.
Ftsg Text: The defect edges were debeveled with a #15 scalpel blade.  Given the location of the defect, shape of the defect and the proximity to free margins a full thickness skin graft was deemed most appropriate.  Using a sterile surgical marker, the primary defect shape was transferred to the donor site. The area thus outlined was incised deep to adipose tissue with a #15 scalpel blade.  The harvested graft was then trimmed of adipose tissue until only dermis and epidermis was left.  The skin margins of the secondary defect were undermined to an appropriate distance in all directions utilizing iris scissors.  The secondary defect was closed with interrupted buried subcutaneous sutures.  The skin edges were then re-apposed with running  sutures.  The skin graft was then placed in the primary defect and oriented appropriately.

## 2018-02-02 ENCOUNTER — HOSPITAL ENCOUNTER (OUTPATIENT)
Dept: RADIOLOGY | Facility: MEDICAL CENTER | Age: 83
End: 2018-02-02

## 2018-02-02 ENCOUNTER — APPOINTMENT (OUTPATIENT)
Dept: RADIOLOGY | Facility: MEDICAL CENTER | Age: 83
DRG: 543 | End: 2018-02-02
Attending: HOSPITALIST
Payer: MEDICARE

## 2018-02-02 ENCOUNTER — HOSPITAL ENCOUNTER (INPATIENT)
Facility: MEDICAL CENTER | Age: 83
LOS: 4 days | DRG: 543 | End: 2018-02-06
Attending: EMERGENCY MEDICINE | Admitting: HOSPITALIST
Payer: MEDICARE

## 2018-02-02 ENCOUNTER — RESOLUTE PROFESSIONAL BILLING HOSPITAL PROF FEE (OUTPATIENT)
Dept: HOSPITALIST | Facility: MEDICAL CENTER | Age: 83
End: 2018-02-02
Payer: MEDICARE

## 2018-02-02 DIAGNOSIS — I48.0 PAROXYSMAL ATRIAL FIBRILLATION (HCC): ICD-10-CM

## 2018-02-02 DIAGNOSIS — S32.040A CLOSED COMPRESSION FRACTURE OF FOURTH LUMBAR VERTEBRA, INITIAL ENCOUNTER: ICD-10-CM

## 2018-02-02 PROBLEM — I82.890 SUPERFICIAL VEIN THROMBOSIS: Status: ACTIVE | Noted: 2018-02-02

## 2018-02-02 PROBLEM — I82.409 DVT (DEEP VENOUS THROMBOSIS) (HCC): Status: ACTIVE | Noted: 2018-02-02

## 2018-02-02 PROBLEM — S32.000A LUMBAR COMPRESSION FRACTURE (HCC): Status: ACTIVE | Noted: 2018-02-02

## 2018-02-02 LAB
ANION GAP SERPL CALC-SCNC: 5 MMOL/L (ref 0–11.9)
APTT PPP: 31.8 SEC (ref 24.7–36)
APTT PPP: 39.6 SEC (ref 24.7–36)
APTT PPP: 96.6 SEC (ref 24.7–36)
BASOPHILS # BLD AUTO: 0.6 % (ref 0–1.8)
BASOPHILS # BLD: 0.05 K/UL (ref 0–0.12)
BUN SERPL-MCNC: 32 MG/DL (ref 8–22)
CALCIUM SERPL-MCNC: 8.9 MG/DL (ref 8.5–10.5)
CHLORIDE SERPL-SCNC: 109 MMOL/L (ref 96–112)
CO2 SERPL-SCNC: 27 MMOL/L (ref 20–33)
CREAT SERPL-MCNC: 1.06 MG/DL (ref 0.5–1.4)
EOSINOPHIL # BLD AUTO: 0.27 K/UL (ref 0–0.51)
EOSINOPHIL NFR BLD: 3.4 % (ref 0–6.9)
ERYTHROCYTE [DISTWIDTH] IN BLOOD BY AUTOMATED COUNT: 47.8 FL (ref 35.9–50)
GLUCOSE SERPL-MCNC: 105 MG/DL (ref 65–99)
HCT VFR BLD AUTO: 44.7 % (ref 42–52)
HGB BLD-MCNC: 14.1 G/DL (ref 14–18)
IMM GRANULOCYTES # BLD AUTO: 0.02 K/UL (ref 0–0.11)
IMM GRANULOCYTES NFR BLD AUTO: 0.3 % (ref 0–0.9)
INR PPP: 1.21 (ref 0.87–1.13)
LYMPHOCYTES # BLD AUTO: 0.76 K/UL (ref 1–4.8)
LYMPHOCYTES NFR BLD: 9.6 % (ref 22–41)
MCH RBC QN AUTO: 30.4 PG (ref 27–33)
MCHC RBC AUTO-ENTMCNC: 31.5 G/DL (ref 33.7–35.3)
MCV RBC AUTO: 96.3 FL (ref 81.4–97.8)
MONOCYTES # BLD AUTO: 0.79 K/UL (ref 0–0.85)
MONOCYTES NFR BLD AUTO: 10 % (ref 0–13.4)
NEUTROPHILS # BLD AUTO: 5.99 K/UL (ref 1.82–7.42)
NEUTROPHILS NFR BLD: 76.1 % (ref 44–72)
NRBC # BLD AUTO: 0 K/UL
NRBC BLD-RTO: 0 /100 WBC
PLATELET # BLD AUTO: 168 K/UL (ref 164–446)
PMV BLD AUTO: 10.5 FL (ref 9–12.9)
POTASSIUM SERPL-SCNC: 3.9 MMOL/L (ref 3.6–5.5)
PROTHROMBIN TIME: 15 SEC (ref 12–14.6)
RBC # BLD AUTO: 4.64 M/UL (ref 4.7–6.1)
SODIUM SERPL-SCNC: 141 MMOL/L (ref 135–145)
WBC # BLD AUTO: 7.9 K/UL (ref 4.8–10.8)

## 2018-02-02 PROCEDURE — 85306 CLOT INHIBIT PROT S FREE: CPT

## 2018-02-02 PROCEDURE — 90471 IMMUNIZATION ADMIN: CPT

## 2018-02-02 PROCEDURE — 72158 MRI LUMBAR SPINE W/O & W/DYE: CPT

## 2018-02-02 PROCEDURE — 80048 BASIC METABOLIC PNL TOTAL CA: CPT

## 2018-02-02 PROCEDURE — A9579 GAD-BASE MR CONTRAST NOS,1ML: HCPCS | Performed by: HOSPITALIST

## 2018-02-02 PROCEDURE — 85730 THROMBOPLASTIN TIME PARTIAL: CPT

## 2018-02-02 PROCEDURE — 85303 CLOT INHIBIT PROT C ACTIVITY: CPT

## 2018-02-02 PROCEDURE — 304561 HCHG STAT O2

## 2018-02-02 PROCEDURE — 700111 HCHG RX REV CODE 636 W/ 250 OVERRIDE (IP): Performed by: HOSPITALIST

## 2018-02-02 PROCEDURE — 36415 COLL VENOUS BLD VENIPUNCTURE: CPT

## 2018-02-02 PROCEDURE — 85301 ANTITHROMBIN III ANTIGEN: CPT

## 2018-02-02 PROCEDURE — A9270 NON-COVERED ITEM OR SERVICE: HCPCS | Performed by: HOSPITALIST

## 2018-02-02 PROCEDURE — 90670 PCV13 VACCINE IM: CPT | Performed by: HOSPITALIST

## 2018-02-02 PROCEDURE — 99223 1ST HOSP IP/OBS HIGH 75: CPT | Mod: AI | Performed by: HOSPITALIST

## 2018-02-02 PROCEDURE — 81241 F5 GENE: CPT

## 2018-02-02 PROCEDURE — 700117 HCHG RX CONTRAST REV CODE 255: Performed by: HOSPITALIST

## 2018-02-02 PROCEDURE — 85025 COMPLETE CBC W/AUTO DIFF WBC: CPT

## 2018-02-02 PROCEDURE — 90662 IIV NO PRSV INCREASED AG IM: CPT | Performed by: HOSPITALIST

## 2018-02-02 PROCEDURE — 99285 EMERGENCY DEPT VISIT HI MDM: CPT

## 2018-02-02 PROCEDURE — 94760 N-INVAS EAR/PLS OXIMETRY 1: CPT

## 2018-02-02 PROCEDURE — 85610 PROTHROMBIN TIME: CPT

## 2018-02-02 PROCEDURE — 770006 HCHG ROOM/CARE - MED/SURG/GYN SEMI*

## 2018-02-02 PROCEDURE — 700102 HCHG RX REV CODE 250 W/ 637 OVERRIDE(OP): Performed by: HOSPITALIST

## 2018-02-02 PROCEDURE — 700105 HCHG RX REV CODE 258: Performed by: HOSPITALIST

## 2018-02-02 PROCEDURE — 85300 ANTITHROMBIN III ACTIVITY: CPT

## 2018-02-02 PROCEDURE — 86147 CARDIOLIPIN ANTIBODY EA IG: CPT | Mod: 91

## 2018-02-02 PROCEDURE — 3E0234Z INTRODUCTION OF SERUM, TOXOID AND VACCINE INTO MUSCLE, PERCUTANEOUS APPROACH: ICD-10-PCS | Performed by: HOSPITALIST

## 2018-02-02 RX ORDER — MORPHINE SULFATE 4 MG/ML
2 INJECTION, SOLUTION INTRAMUSCULAR; INTRAVENOUS
Status: DISCONTINUED | OUTPATIENT
Start: 2018-02-02 | End: 2018-02-06 | Stop reason: HOSPADM

## 2018-02-02 RX ORDER — SODIUM CHLORIDE 9 MG/ML
1000 INJECTION, SOLUTION INTRAVENOUS ONCE
Status: COMPLETED | OUTPATIENT
Start: 2018-02-02 | End: 2018-02-02

## 2018-02-02 RX ORDER — HEPARIN SODIUM 1000 [USP'U]/ML
3200 INJECTION, SOLUTION INTRAVENOUS; SUBCUTANEOUS PRN
Status: DISCONTINUED | OUTPATIENT
Start: 2018-02-02 | End: 2018-02-03

## 2018-02-02 RX ORDER — POLYETHYLENE GLYCOL 3350 17 G/17G
1 POWDER, FOR SOLUTION ORAL
Status: DISCONTINUED | OUTPATIENT
Start: 2018-02-02 | End: 2018-02-06 | Stop reason: HOSPADM

## 2018-02-02 RX ORDER — OXYCODONE HYDROCHLORIDE 5 MG/1
2.5 TABLET ORAL
Status: DISCONTINUED | OUTPATIENT
Start: 2018-02-02 | End: 2018-02-06 | Stop reason: HOSPADM

## 2018-02-02 RX ORDER — BISACODYL 10 MG
10 SUPPOSITORY, RECTAL RECTAL
Status: DISCONTINUED | OUTPATIENT
Start: 2018-02-02 | End: 2018-02-06 | Stop reason: HOSPADM

## 2018-02-02 RX ORDER — HEPARIN SODIUM 1000 [USP'U]/ML
3200 INJECTION, SOLUTION INTRAVENOUS; SUBCUTANEOUS PRN
Status: DISCONTINUED | OUTPATIENT
Start: 2018-02-02 | End: 2018-02-02

## 2018-02-02 RX ORDER — ACETAMINOPHEN 325 MG/1
650 TABLET ORAL EVERY 6 HOURS PRN
Status: DISCONTINUED | OUTPATIENT
Start: 2018-02-02 | End: 2018-02-06 | Stop reason: HOSPADM

## 2018-02-02 RX ORDER — AMOXICILLIN 250 MG
2 CAPSULE ORAL 2 TIMES DAILY
Status: DISCONTINUED | OUTPATIENT
Start: 2018-02-02 | End: 2018-02-06 | Stop reason: HOSPADM

## 2018-02-02 RX ORDER — OXYCODONE HYDROCHLORIDE 5 MG/1
5 TABLET ORAL
Status: DISCONTINUED | OUTPATIENT
Start: 2018-02-02 | End: 2018-02-06 | Stop reason: HOSPADM

## 2018-02-02 RX ADMIN — HEPARIN SODIUM 3200 UNITS: 1000 INJECTION, SOLUTION INTRAVENOUS; SUBCUTANEOUS at 17:48

## 2018-02-02 RX ADMIN — HEPARIN SODIUM 1200 UNITS/HR: 5000 INJECTION, SOLUTION INTRAVENOUS at 10:24

## 2018-02-02 RX ADMIN — OXYCODONE HYDROCHLORIDE 5 MG: 5 TABLET ORAL at 17:56

## 2018-02-02 RX ADMIN — SODIUM CHLORIDE 1000 ML: 9 INJECTION, SOLUTION INTRAVENOUS at 04:15

## 2018-02-02 RX ADMIN — GADODIAMIDE 20 ML: 287 INJECTION INTRAVENOUS at 15:33

## 2018-02-02 RX ADMIN — PNEUMOCOCCAL 13-VALENT CONJUGATE VACCINE 0.5 ML: 2.2; 2.2; 2.2; 2.2; 2.2; 4.4; 2.2; 2.2; 2.2; 2.2; 2.2; 2.2; 2.2 INJECTION, SUSPENSION INTRAMUSCULAR at 17:16

## 2018-02-02 RX ADMIN — MORPHINE SULFATE 2 MG: 4 INJECTION INTRAVENOUS at 12:04

## 2018-02-02 RX ADMIN — STANDARDIZED SENNA CONCENTRATE AND DOCUSATE SODIUM 2 TABLET: 8.6; 5 TABLET, FILM COATED ORAL at 09:00

## 2018-02-02 RX ADMIN — INFLUENZA A VIRUSA/MICHIGAN/45/2015 X-275 (H1N1) ANTIGEN (FORMALDEHYDE INACTIVATED), INFLUENZA A VIRUS A/HONG KONG/4801/2014 X-263B (H3N2) ANTIGEN (FORMALDEHYDE INACTIVATED), AND INFLUENZA B VIRUS B/BRISBANE/60/2008 ANTIGEN (FORMALDEHYDE INACTIVATED) 0.5 ML: 60; 60; 60 INJECTION, SUSPENSION INTRAMUSCULAR at 17:17

## 2018-02-02 RX ADMIN — OXYCODONE HYDROCHLORIDE 5 MG: 5 TABLET ORAL at 23:45

## 2018-02-02 ASSESSMENT — ENCOUNTER SYMPTOMS
SORE THROAT: 0
CHILLS: 0
DIZZINESS: 0
ABDOMINAL PAIN: 0
SHORTNESS OF BREATH: 0
VOMITING: 0
FOCAL WEAKNESS: 0
NAUSEA: 0
MYALGIAS: 0
PALPITATIONS: 0
DEPRESSION: 0
HEADACHES: 0
FEVER: 0
WHEEZING: 0
TINGLING: 0
BACK PAIN: 1
COUGH: 0
DIARRHEA: 0
PHOTOPHOBIA: 0

## 2018-02-02 ASSESSMENT — COPD QUESTIONNAIRES
DO YOU EVER COUGH UP ANY MUCUS OR PHLEGM?: NO/ONLY WITH OCCASIONAL COLDS OR INFECTIONS
HAVE YOU SMOKED AT LEAST 100 CIGARETTES IN YOUR ENTIRE LIFE: YES
COPD SCREENING SCORE: 6
DURING THE PAST 4 WEEKS HOW MUCH DID YOU FEEL SHORT OF BREATH: SOME OF THE TIME

## 2018-02-02 ASSESSMENT — PAIN SCALES - GENERAL
PAINLEVEL_OUTOF10: 8
PAINLEVEL_OUTOF10: 4
PAINLEVEL_OUTOF10: 0
PAINLEVEL_OUTOF10: 3
PAINLEVEL_OUTOF10: 6
PAINLEVEL_OUTOF10: 7
PAINLEVEL_OUTOF10: 2

## 2018-02-02 ASSESSMENT — PATIENT HEALTH QUESTIONNAIRE - PHQ9
3. TROUBLE FALLING OR STAYING ASLEEP OR SLEEPING TOO MUCH: NOT AT ALL
5. POOR APPETITE OR OVEREATING: NOT AT ALL
8. MOVING OR SPEAKING SO SLOWLY THAT OTHER PEOPLE COULD HAVE NOTICED. OR THE OPPOSITE, BEING SO FIGETY OR RESTLESS THAT YOU HAVE BEEN MOVING AROUND A LOT MORE THAN USUAL: NOT AT ALL
SUM OF ALL RESPONSES TO PHQ9 QUESTIONS 1 AND 2: 0
6. FEELING BAD ABOUT YOURSELF - OR THAT YOU ARE A FAILURE OR HAVE LET YOURSELF OR YOUR FAMILY DOWN: NOT AL ALL
4. FEELING TIRED OR HAVING LITTLE ENERGY: NOT AT ALL
1. LITTLE INTEREST OR PLEASURE IN DOING THINGS: NOT AT ALL
7. TROUBLE CONCENTRATING ON THINGS, SUCH AS READING THE NEWSPAPER OR WATCHING TELEVISION: NOT AT ALL
2. FEELING DOWN, DEPRESSED, IRRITABLE, OR HOPELESS: NOT AT ALL
SUM OF ALL RESPONSES TO PHQ QUESTIONS 1-9: 0
9. THOUGHTS THAT YOU WOULD BE BETTER OFF DEAD, OR OF HURTING YOURSELF: NOT AT ALL

## 2018-02-02 ASSESSMENT — LIFESTYLE VARIABLES
EVER_SMOKED: YES
ALCOHOL_USE: NO

## 2018-02-02 NOTE — ASSESSMENT & PLAN NOTE
dr. Hartman:  recommend TLSO, nonsurgical.   Patient receiving symptomatic management for pain   PT/OT rec SNF  L4 compression fx.

## 2018-02-02 NOTE — ED NOTES
Med rec completed by interview with patient at bedside and [facility]  Pt reported taking cefdinir twice daily which he will be taking for the rest of his life is what he reported  Allergies not able to be reviewed due to the patient not knowing the reaction to the medications

## 2018-02-02 NOTE — ED PROVIDER NOTES
ED Provider Note    ED Provider Note      Primary care provider: Abhijit Ortega M.D.    CHIEF COMPLAINT  Chief Complaint   Patient presents with   • GLF     5 days prior   • Back Pain     L-4 burst fracture   • Leg Swelling     LLE DVT       HPI  Dayne Franco is a 84 y.o. male who presents to the Emergency Department with chief complaint of low back pain. Patient states 5 days prior you was bending down and put some cream on his leg. He then lost his balance fell backwards and landed on his butt. Since then he's been having some increasing lower back pain. He currently rates his made at the 10 with ambulation states the pain shoots down his legs bilaterally left greater than right. Or suicidal anesthesia no fecal incontinence or urinary retention is also reported some swelling in the left lower extremity recently. 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 Transferred here for further evaluation and treatment. Patient had no headache no altered mental status and has cough congestion chest.  breath denies pain in upper extremities.    REVIEW OF SYSTEMS  10 systems reviewed and otherwise negative, pertinent positives and negatives listed in the history of present illness.    PAST MEDICAL HISTORY   has a past medical history of Anemia, unspecified; Aortic valve disorders; Atrial fibrillation (CMS-[facility], located within [facility]); Atrial flutter (CMS-LTAC, located within [facility]); Cellulitis and abscess of unspecified site; DVT; Non-rheumatic mitral valve stenosis (7/27/2017); Postmyocardial infarction syndrome (CMS-HCC); Pure hypercholesterolemia; and PVD (peripheral vascular disease) (CMS-HCC).    SURGICAL HISTORY   has a past surgical history that includes aortic valve replacement (6/3/08); maze procedure (6/3/08); mass excision general (6/3/08); aortic valve replacement; and foot surgery.    SOCIAL HISTORY  Social History    Substance Use Topics   • Smoking status: Former Smoker   • Smokeless tobacco: Never Used   • Alcohol use No      History   Drug Use No       FAMILY HISTORY  Non-Contributory    CURRENT MEDICATIONS  Home Medications     Reviewed by Diallo Palmer [place] (Registered Nurse) on 02/02/18 at 0151  Med List Status: Partial   Medication Last Dose Status   cefdinir (OMNICEF) 300 MG CAPS 11/30/2017 Active   docosahexanoic acid (OMEGA 3 FA) 1000 MG CAPS 11/30/2017 Active   Misc Natural Products (GLUCOSAMINE CHOND COMPLEX/MSM PO) 11/30/2017 Active   Multiple Vitamins-Minerals (OCUVITE) TABS 11/30/2017 Active   Nattokinase 100 MG CAPS 11/30/2017 Active                ALLERGIES  Allergies   Allergen Reactions   • Aggrenox [Aspirin-Dipyridamole]    • Plavix [Clopidogrel Bisulfate]        PHYSICAL EXAM  VITAL SIGNS: Pulse 62   Temp 36.7 °C (98 °F)   Resp 19   Wt 89 kg (196 lb 3.4 oz)   SpO2 95%   BMI 28.15 kg/m²   Pulse ox interpretation: I interpret this pulse ox as normal.  Constitutional: Alert and oriented x 3, Distress  HEENT: Atraumatic normocephalic, pupils are equal round reactive to light extraocular movements are intact. The nares is clear, external ears are normal, mouth shows moist mucous membranes  Neck: Supple, no JVD no tracheal deviation  Cardiovascular: Regular rate and rhythm no murmur rub or gallop 2+ pulses peripherally x4  Thorax & Lungs: No respiratory distress, no wheezes rales or rhonchi, No chest tenderness.   GI: Soft nontender nondistended positive bowel sounds, no peritoneal signs  : Deferred  Rectal: Deferred  Skin: Warm dry no acute rash or lesion  Musculoskeletal: Moving all extremities with full range and 5 of 5 strength, no acute  Deformity, patient has minor abrasions in the upper back there is tenderness throughout the lumbar distress patient both midline and paraspinally no palpable step-off, patient has pitting edema 1+ to the knees bilateral lower extremities  Neurologic: Cranial  nerves III through XII are grossly intact, no sensory deficit, no cerebellar dysfunction   Psychiatric: Appropriate affect for situation at this time      DIAGNOSTIC STUDIES / PROCEDURES  LABS  Results for orders placed or performed during the hospital encounter of 02/02/18   CBC WITH DIFFERENTIAL   Result Value Ref Range    WBC 7.9 4.8 - 10.8 K/uL    RBC 4.64 (L) 4.70 - 6.10 M/uL    Hemoglobin 14.1 14.0 - 18.0 g/dL    Hematocrit 44.7 42.0 - 52.0 %    MCV 96.3 81.4 - 97.8 fL    MCH 30.4 27.0 - 33.0 pg    MCHC 31.5 (L) 33.7 - 35.3 g/dL    RDW 47.8 35.9 - 50.0 fL    Platelet Count 168 164 - 446 K/uL    MPV 10.5 9.0 - 12.9 fL    Neutrophils-Polys 76.10 (H) 44.00 - 72.00 %    Lymphocytes 9.60 (L) 22.00 - 41.00 %    Monocytes 10.00 0.00 - 13.40 %    Eosinophils 3.40 0.00 - 6.90 %    Basophils 0.60 0.00 - 1.80 %    Immature Granulocytes 0.30 0.00 - 0.90 %    Nucleated RBC 0.00 /100 WBC    Neutrophils (Absolute) 5.99 1.82 - 7.42 K/uL    Lymphs (Absolute) 0.76 (L) 1.00 - 4.80 K/uL    Monos (Absolute) 0.79 0.00 - 0.85 K/uL    Eos (Absolute) 0.27 0.00 - 0.51 K/uL    Baso (Absolute) 0.05 0.00 - 0.12 K/uL    Immature Granulocytes (abs) 0.02 0.00 - 0.11 K/uL    NRBC (Absolute) 0.00 K/uL   BASIC METABOLIC PANEL   Result Value Ref Range    Sodium 141 135 - 145 mmol/L    Potassium 3.9 3.6 - 5.5 mmol/L    Chloride 109 96 - 112 mmol/L    Co2 27 20 - 33 mmol/L    Glucose 105 (H) 65 - 99 mg/dL    Bun 32 (H) 8 - 22 mg/dL    Creatinine 1.06 0.50 - 1.40 mg/dL    Calcium 8.9 8.5 - 10.5 mg/dL    Anion Gap 5.0 0.0 - 11.9   PROTHROMBIN TIME   Result Value Ref Range    PT 15.0 (H) 12.0 - 14.6 sec    INR 1.21 (H) 0.87 - 1.13   APTT   Result Value Ref Range    APTT 31.8 24.7 - 36.0 sec   ESTIMATED GFR   Result Value Ref Range    GFR If African American >60 >60 mL/min/1.73 m 2    GFR If Non African American >60 >60 mL/min/1.73 m 2       All labs reviewed by me.      RADIOLOGY  Radiology reviewed from [facility] shows L4 burst fracture  minor retropulsion and left lower extremity DVT in the superficial femoral vein    The radiologist's interpretation of all radiological studies have been reviewed by me.    COURSE & MEDICAL DECISION MAKING  Pertinent Labs & Imaging studies reviewed. (See chart for details)    1:58 AM - Patient seen and examined at bedside.       Medical Decision Making: Patient transferred from outside hospital as above. Vital signs stable arrival labs and imaging from outside hospital reviewed. I discussed the case with neurosurgeon Dr. Tristan who reviewed films and does not those of this fracture is likely acute recommends TLSO brace and he will see the patient tomorrow patient was given Lovenox prior to transfer from [facility] we will begin heparin and 12 hours. Was discussed with hospitalist  admitted in guarded condition.    Pulse 62   Temp 36.7 °C (98 °F)   Resp 19   Wt 89 kg (196 lb 3.4 oz)   SpO2 95%   BMI 28.15 kg/m²     FINAL IMPRESSION  1. Low back pain  2. Ground-level fall  3. Left lower extremity DVT  4. L4 burst fracture    This dictation has been created using voice recognition software and/or scribes. The accuracy of the dictation is limited by the abilities of the software and the expertise of the scribes. I expect there may be some errors of grammar and possibly content. I made every attempt to manually correct the errors within my dictation. However, errors related to voice recognition software and/or scribes may still exist and should be interpreted within the appropriate context.

## 2018-02-02 NOTE — ASSESSMENT & PLAN NOTE
Patient is followed by Dr. Collins of cardiology, the patient follow-up as needed once medically stable for discharge.

## 2018-02-02 NOTE — PROGRESS NOTES
Received report from Leena Muniz in the Er, extension [phone]. Patient arrived from [facility]. Patient fell 1 week ago, he is A & O x 4, has two IV's 20 gauges on bilateral arms, has NS @ 75ml/hr infusing. Will get the patient comfortable when he arrives to the floor.

## 2018-02-02 NOTE — ED TRIAGE NOTES
Pt given 5-325 percocet x1, 4 mg morphine, and 90 mg of lovenox at [facility].

## 2018-02-02 NOTE — ASSESSMENT & PLAN NOTE
"Patient was sent from an [facility] facility with a diagnosis of deep venous thrombosis however review of the records sent from the facility notes that a left lower extremity ultrasound showed a partial occlusion thrombus of the left mid superficial femoral vein. This does not require anticoagulant therapy. However, given his history of repeat \"blood clots\" although whether they were superficial or deep is unknown at this time, may warrant further discussion regarding anticoagulation. He was given Lovenox at the [facility] facility which was weight-based and plan had been to start on heparin therapy but I will stop the heparin drip ordered now given the fact that this is a superficial vein. I will however order a hypercoagulability panel to assess for factor V Leyden deficiency, as well as protein C and protein S deficiency given his family history of blood clots.  "

## 2018-02-02 NOTE — H&P
" Hospital Medicine History and Physical    Date of Service  2/2/2018    Chief Complaint  Chief Complaint   Patient presents with   • GLF     5 days prior   • Back Pain     L-4 burst fracture   • Leg Swelling     LLE DVT       History of Presenting Illness  84 y.o. male who presented on 2/2/2018 to an [facility] facility with complaints of lower back pain. The patient reports that he had been applying lotion approximately 5 days ago when he slipped and fell backwards onto his buttocks. He has a history of mild chronic lower back pain but this has been worse since his fall. He also reports bilateral lower extremity pain which is worse on the left than the right. Upon assessment at the [facility] facility, a CT scan was completed which showed an acute L4 compression fracture. Additionally, an ultrasound was completed of the left lower extremity which showed partial occlusion thrombus of the left mid superior femoral vein which may be chronic. Patient reports that he has had 2 blood clots in the past. One diagnosed in his right leg when he was in his 20s prior to joining the [employer]. The other was diagnosed in the left leg in his 30s to 40s. He states that he has never been worked up for underlying protein deficiencies. He reports that he has been on and off Coumadin for at least 3 times in his lifetime. His mother and younger brother also carry histories of \"blood clots\". Given the remote episodes of blood clotting, he is unsure if they were ever described as a deep or superficial blood clots. The patient otherwise has been in his usual state of health, he is typically active at baseline and gets up every day and uses his walker to ambulate. He denies any fevers chills, headache, chest pain, shortness of breath, abdominal pain, diarrhea or dysuria. No recent URIs.        Primary Care Physician  Abhijit Ortega M.D.    Consultants  Dr. Villanueva, neurosurgery    Code Status  Full    Review of Systems  Review of Systems "   Constitutional: Negative for chills and fever.   HENT: Negative for congestion and sore throat.    Eyes: Negative for photophobia.   Respiratory: Negative for cough, shortness of breath and wheezing.    Cardiovascular: Positive for leg swelling. Negative for chest pain and palpitations.   Gastrointestinal: Negative for abdominal pain, diarrhea, nausea and vomiting.   Genitourinary: Negative for dysuria.   Musculoskeletal: Positive for back pain. Negative for myalgias.        Leg pain bilaterally   Skin: Negative.    Neurological: Negative for dizziness, tingling, focal weakness and headaches.   Psychiatric/Behavioral: Negative for depression and suicidal ideas.        Past Medical History  Past Medical History:   Diagnosis Date   • Non-rheumatic mitral valve stenosis 7/27/2017    Mild and and mild regurgitation due to calcific mitral disease   • Anemia, unspecified     associated with chronic elevation of sed rate, possibly due to the chronic inflamation of the R leg   • Aortic valve disorders     S/P AVR    • Atrial fibrillation (CMS-HCC)     S/P maze, period of SR post op then persistent atrial flutter.    • Atrial flutter (CMS-HCC)     persistent after maze procedure with spontaneous reversion to sinus rhythm with atypical P waves.   • Cellulitis and abscess of unspecified site     R leg post trauma and on chronic antibiotic supression   • DVT     right   • Postmyocardial infarction syndrome (CMS-HCC)     Dressler's post op AVR   • Pure hypercholesterolemia    • PVD (peripheral vascular disease) (CMS-HCC)        Surgical History  Past Surgical History:   Procedure Laterality Date   • AORTIC VALVE REPLACEMENT  6/3/08    Performed by GHASSAN CLIFTON at SURGERY [facility] ORS   • MAZE PROCEDURE  6/3/08    Performed by GHASSAN CLIFTON at SURGERY [facility] ORS   • MASS EXCISION GENERAL  6/3/08    Performed by GHASSAN CLIFTON at SURGERY [facility] ORS   • AORTIC VALVE REPLACEMENT      bovine pericardial   • FOOT  SURGERY         Medications  No current facility-administered medications on file prior to encounter.      Current Outpatient Prescriptions on File Prior to Encounter   Medication Sig Dispense Refill   • Misc Natural Products (GLUCOSAMINE CHOND COMPLEX/MSM PO) Take  by mouth.     • Multiple Vitamins-Minerals (OCUVITE) TABS Take 2 Tabs by mouth 2 times a day.     • docosahexanoic acid (OMEGA 3 FA) 1000 MG CAPS Take 1,000 mg by mouth 2 Times a Day.     • cefdinir (OMNICEF) 300 MG CAPS Take 300 mg by mouth every day.     • Nattokinase 100 MG CAPS Take  by mouth every day.         Family History  Family History   Problem Relation Age of Onset   • Other Neg Hx        Social History  Social History   Substance Use Topics   • Smoking status: Former Smoker   • Smokeless tobacco: Never Used   • Alcohol use No       Allergies  Allergies   Allergen Reactions   • Aggrenox [Aspirin-Dipyridamole]    • Plavix [Clopidogrel Bisulfate]         Physical Exam  Laboratory   Hemodynamics  Temp (24hrs), Av.7 °C (98 °F), Min:36.7 °C (98 °F), Max:36.7 °C (98 °F)   Temperature: 36.7 °C (98 °F)  Pulse  Av.3  Min: 53  Max: 62 Heart Rate (Monitored): (!) 55  NIBP: 153/54      Respiratory      Respiration: 18, Pulse Oximetry: 94 %             Physical Exam   Constitutional: He is oriented to person, place, and time. No distress.   Elderly, frail   HENT:   Head: Normocephalic and atraumatic.   Right Ear: External ear normal.   Left Ear: External ear normal.   Eyes: EOM are normal. Right eye exhibits no discharge. Left eye exhibits no discharge.   Neck: Neck supple. No JVD present.   Cardiovascular: Normal rate, regular rhythm and normal heart sounds.    Pulmonary/Chest: Effort normal and breath sounds normal. No respiratory distress. He exhibits no tenderness.   Abdominal: Soft. Bowel sounds are normal. He exhibits no distension. There is no tenderness.   Musculoskeletal: He exhibits edema (nonpitting, left worse than right).  "  Neurological: He is alert and oriented to person, place, and time. No cranial nerve deficit.   Skin: Skin is dry. He is not diaphoretic. No erythema.   Venous stasis changes on the right more prominent than the left   Psychiatric: He has a normal mood and affect. His behavior is normal.   Nursing note and vitals reviewed.    Capillary refill less than 3 seconds, distal pulses intact            No results for input(s): ALTSGPT, ASTSGOT, ALKPHOSPHAT, TBILIRUBIN, DBILIRUBIN, GAMMAGT, AMYLASE, LIPASE, ALB, PREALBUMIN, GLUCOSE in the last 72 hours.              No results found for: TROPONINI    Imaging  No results found.   Assessment/Plan     Anticipate that patient will need greater than 2 midnights for management of the discussed medical issues.    This dictation was created using voice recognition software. The accuracy of the dictation is limited to the abilities of the software. I expect there may be some errors of grammar and possibly content.    * Superficial vein thrombosis   Assessment & Plan    Patient was sent from an [facility] facility with a diagnosis of deep venous thrombosis however review of the records sent from the facility notes that a left lower extremity ultrasound showed a partial occlusion thrombus of the left mid superficial femoral vein. This does not require anticoagulant therapy. However, given his history of repeat \"blood clots\" although whether they were superficial or deep is unknown at this time, may warrant further discussion regarding anticoagulation. He was given Lovenox at the [facility] facility which was weight-based and plan had been to start on heparin therapy but I will stop the heparin drip ordered now given the fact that this is a superficial vein. I will however order a hypercoagulability panel to assess for factor V Leyden deficiency, as well as protein C and protein S deficiency given his family history of blood clots.        Lumbar compression fracture (CMS-HCC)   Assessment " & Plan     I will check an MRI of the lumbar spine to see if the injury is amenable to kyphoplasty. In the meantime,TLSO recommended by neurosurgery, I look forward to further recommendations. Patient received symptomatic management for pain and I have requested physical therapy and outpatient therapy consultation.        S/P aortic valve replacement- (present on admission)   Assessment & Plan    Patient is followed by Dr. Collins of cardiology, follow-up as needed once medically stable for discharge.          Prophylaxis: Sequential compression devices for DVT prophylaxis, no PPI indicated, stool softeners ordered

## 2018-02-02 NOTE — ED TRIAGE NOTES
Pt transferred from [facility] via EMS for GLF with L-4 burst fracture and DVT in left leg.  Pt currently AOx4 and not complaining of pain while laying still.

## 2018-02-03 PROBLEM — L08.89 CHRONIC BACTERIAL SKIN INFECTION: Status: ACTIVE | Noted: 2018-02-03

## 2018-02-03 PROBLEM — I82.512 CHRONIC DEEP VEIN THROMBOSIS (DVT) OF FEMORAL VEIN OF LEFT LOWER EXTREMITY (HCC): Status: ACTIVE | Noted: 2018-02-03

## 2018-02-03 PROBLEM — B96.89 CHRONIC BACTERIAL SKIN INFECTION: Status: ACTIVE | Noted: 2018-02-03

## 2018-02-03 LAB
ANION GAP SERPL CALC-SCNC: 9 MMOL/L (ref 0–11.9)
APTT PPP: 71 SEC (ref 24.7–36)
APTT PPP: 78.4 SEC (ref 24.7–36)
BUN SERPL-MCNC: 32 MG/DL (ref 8–22)
CALCIUM SERPL-MCNC: 8.6 MG/DL (ref 8.5–10.5)
CHLORIDE SERPL-SCNC: 107 MMOL/L (ref 96–112)
CO2 SERPL-SCNC: 25 MMOL/L (ref 20–33)
CREAT SERPL-MCNC: 0.88 MG/DL (ref 0.5–1.4)
EKG IMPRESSION: NORMAL
ERYTHROCYTE [DISTWIDTH] IN BLOOD BY AUTOMATED COUNT: 48.1 FL (ref 35.9–50)
GLUCOSE SERPL-MCNC: 110 MG/DL (ref 65–99)
HCT VFR BLD AUTO: 45.2 % (ref 42–52)
HGB BLD-MCNC: 14.4 G/DL (ref 14–18)
MCH RBC QN AUTO: 30.9 PG (ref 27–33)
MCHC RBC AUTO-ENTMCNC: 31.9 G/DL (ref 33.7–35.3)
MCV RBC AUTO: 97 FL (ref 81.4–97.8)
PLATELET # BLD AUTO: 164 K/UL (ref 164–446)
PMV BLD AUTO: 10.8 FL (ref 9–12.9)
POTASSIUM SERPL-SCNC: 4 MMOL/L (ref 3.6–5.5)
RBC # BLD AUTO: 4.66 M/UL (ref 4.7–6.1)
SODIUM SERPL-SCNC: 141 MMOL/L (ref 135–145)
WBC # BLD AUTO: 8 K/UL (ref 4.8–10.8)

## 2018-02-03 PROCEDURE — 93010 ELECTROCARDIOGRAM REPORT: CPT | Performed by: INTERNAL MEDICINE

## 2018-02-03 PROCEDURE — 770006 HCHG ROOM/CARE - MED/SURG/GYN SEMI*

## 2018-02-03 PROCEDURE — A9270 NON-COVERED ITEM OR SERVICE: HCPCS | Performed by: HOSPITALIST

## 2018-02-03 PROCEDURE — G8987 SELF CARE CURRENT STATUS: HCPCS | Mod: CK

## 2018-02-03 PROCEDURE — 700102 HCHG RX REV CODE 250 W/ 637 OVERRIDE(OP)

## 2018-02-03 PROCEDURE — 97535 SELF CARE MNGMENT TRAINING: CPT

## 2018-02-03 PROCEDURE — 700102 HCHG RX REV CODE 250 W/ 637 OVERRIDE(OP): Performed by: HOSPITALIST

## 2018-02-03 PROCEDURE — G8979 MOBILITY GOAL STATUS: HCPCS | Mod: CJ

## 2018-02-03 PROCEDURE — G8978 MOBILITY CURRENT STATUS: HCPCS | Mod: CL

## 2018-02-03 PROCEDURE — 97166 OT EVAL MOD COMPLEX 45 MIN: CPT

## 2018-02-03 PROCEDURE — 93005 ELECTROCARDIOGRAM TRACING: CPT | Performed by: HOSPITALIST

## 2018-02-03 PROCEDURE — L0458 TLSO 2MOD SYMPHIS-XIPHO PRE: HCPCS

## 2018-02-03 PROCEDURE — 700111 HCHG RX REV CODE 636 W/ 250 OVERRIDE (IP): Performed by: HOSPITALIST

## 2018-02-03 PROCEDURE — 36415 COLL VENOUS BLD VENIPUNCTURE: CPT

## 2018-02-03 PROCEDURE — 80048 BASIC METABOLIC PNL TOTAL CA: CPT

## 2018-02-03 PROCEDURE — A9270 NON-COVERED ITEM OR SERVICE: HCPCS

## 2018-02-03 PROCEDURE — 85730 THROMBOPLASTIN TIME PARTIAL: CPT

## 2018-02-03 PROCEDURE — 97163 PT EVAL HIGH COMPLEX 45 MIN: CPT

## 2018-02-03 PROCEDURE — 85027 COMPLETE CBC AUTOMATED: CPT

## 2018-02-03 PROCEDURE — G8988 SELF CARE GOAL STATUS: HCPCS | Mod: CI

## 2018-02-03 PROCEDURE — 99233 SBSQ HOSP IP/OBS HIGH 50: CPT | Performed by: HOSPITALIST

## 2018-02-03 PROCEDURE — L0150 CERV SEMI-RIG ADJ MOLDED CHN: HCPCS

## 2018-02-03 RX ORDER — CEFDINIR 300 MG/1
300 CAPSULE ORAL 2 TIMES DAILY
Status: DISCONTINUED | OUTPATIENT
Start: 2018-02-03 | End: 2018-02-06 | Stop reason: HOSPADM

## 2018-02-03 RX ORDER — ALBUTEROL SULFATE 90 UG/1
AEROSOL, METERED RESPIRATORY (INHALATION)
Status: COMPLETED
Start: 2018-02-03 | End: 2018-02-03

## 2018-02-03 RX ADMIN — OXYCODONE HYDROCHLORIDE 5 MG: 5 TABLET ORAL at 12:16

## 2018-02-03 RX ADMIN — APIXABAN 10 MG: 5 TABLET, FILM COATED ORAL at 21:09

## 2018-02-03 RX ADMIN — OXYCODONE HYDROCHLORIDE 5 MG: 5 TABLET ORAL at 21:10

## 2018-02-03 RX ADMIN — CEFDINIR 300 MG: 300 CAPSULE ORAL at 21:09

## 2018-02-03 RX ADMIN — HEPARIN SODIUM 25000 UNITS: 5000 INJECTION, SOLUTION INTRAVENOUS at 05:34

## 2018-02-03 RX ADMIN — STANDARDIZED SENNA CONCENTRATE AND DOCUSATE SODIUM 2 TABLET: 8.6; 5 TABLET, FILM COATED ORAL at 10:05

## 2018-02-03 ASSESSMENT — COGNITIVE AND FUNCTIONAL STATUS - GENERAL
SUGGESTED CMS G CODE MODIFIER MOBILITY: CL
WALKING IN HOSPITAL ROOM: A LITTLE
MOBILITY SCORE: 14
MOVING FROM LYING ON BACK TO SITTING ON SIDE OF FLAT BED: A LOT
STANDING UP FROM CHAIR USING ARMS: A LITTLE
PERSONAL GROOMING: A LITTLE
CLIMB 3 TO 5 STEPS WITH RAILING: A LOT
DRESSING REGULAR UPPER BODY CLOTHING: A LOT
SUGGESTED CMS G CODE MODIFIER DAILY ACTIVITY: CK
DAILY ACTIVITIY SCORE: 14
DRESSING REGULAR LOWER BODY CLOTHING: A LOT
HELP NEEDED FOR BATHING: A LOT
TOILETING: A LOT
EATING MEALS: A LITTLE
TURNING FROM BACK TO SIDE WHILE IN FLAT BAD: A LOT
MOVING TO AND FROM BED TO CHAIR: A LOT

## 2018-02-03 ASSESSMENT — ENCOUNTER SYMPTOMS
NAUSEA: 0
ABDOMINAL PAIN: 0
CHILLS: 0
COUGH: 0
DIZZINESS: 0
FOCAL WEAKNESS: 0
WHEEZING: 0
EYE PAIN: 0
DIAPHORESIS: 0
SPEECH CHANGE: 0
SPUTUM PRODUCTION: 0
DIARRHEA: 0
VOMITING: 0
BACK PAIN: 1
CONSTIPATION: 0
NECK PAIN: 0
MYALGIAS: 0
EYE DISCHARGE: 0
FEVER: 0
WEAKNESS: 0
PALPITATIONS: 0
CLAUDICATION: 0
SHORTNESS OF BREATH: 0
SORE THROAT: 0
DEPRESSION: 0
BRUISES/BLEEDS EASILY: 0
HEMOPTYSIS: 0
LOSS OF CONSCIOUSNESS: 0
SENSORY CHANGE: 0
HEADACHES: 0

## 2018-02-03 ASSESSMENT — PAIN SCALES - GENERAL
PAINLEVEL_OUTOF10: 0
PAINLEVEL_OUTOF10: 7
PAINLEVEL_OUTOF10: 5
PAINLEVEL_OUTOF10: 5
PAINLEVEL_OUTOF10: 0
PAINLEVEL_OUTOF10: 3
PAINLEVEL_OUTOF10: 5

## 2018-02-03 ASSESSMENT — GAIT ASSESSMENTS
GAIT LEVEL OF ASSIST: CONTACT GUARD ASSIST
ASSISTIVE DEVICE: FRONT WHEEL WALKER
DISTANCE (FEET): 10

## 2018-02-03 ASSESSMENT — LIFESTYLE VARIABLES: SUBSTANCE_ABUSE: 0

## 2018-02-03 ASSESSMENT — ACTIVITIES OF DAILY LIVING (ADL): TOILETING: INDEPENDENT

## 2018-02-03 NOTE — PROGRESS NOTES
Patient arrived to the unit, PIV patent and running NS. AA&O x4. Skin is intact without redness or breakdowns. Safety measures in place POC discussed. Hourly rounding in place

## 2018-02-03 NOTE — CONSULTS
Neurosurgery Consultation  2/1/2018 1630  Referring MD:  Dr. Chavez, [facility] ED        Primary care provider: Abhijit Ortega M.D.     CHIEF COMPLAINT       Low back pain                                       HPI  84 year old male who fell 5 days ago.  He has low lumbar pain with radiation into his legs with walking.         REVIEW OF SYSTEMS  10 systems reviewed and otherwise negative, pertinent positives and negatives listed in the history of present illness.     PAST MEDICAL HISTORY   has a past medical history of Anemia, unspecified; Aortic valve disorders; Atrial fibrillation (CMS-[facility]); Atrial flutter (CMS-[facility]); Cellulitis and abscess of unspecified site; DVT; Non-rheumatic mitral valve stenosis (7/27/2017); Postmyocardial infarction syndrome (CMS-[facility]); Pure hypercholesterolemia; and PVD (peripheral vascular disease) (CMS-HCC).     SURGICAL HISTORY   has a past surgical history that includes aortic valve replacement (6/3/08); maze procedure (6/3/08); mass excision general (6/3/08); aortic valve replacement; and foot surgery.     SOCIAL HISTORY       Social History   Substance Use Topics   • Smoking status: Former Smoker   • Smokeless tobacco: Never Used   • Alcohol use No          History   Drug Use No         FAMILY HISTORY  Non-Contributory     CURRENT MEDICATIONS      Home Medications             Reviewed by Diallo Palmer RISAURA (Registered Nurse) on 02/02/18 at 0151  Med List Status: Partial          Medication Last Dose Status    cefdinir (OMNICEF) 300 MG CAPS 11/30/2017 Active    docosahexanoic acid (OMEGA 3 FA) 1000 MG CAPS 11/30/2017 Active    Misc Natural Products (GLUCOSAMINE CHOND COMPLEX/MSM PO) 11/30/2017 Active    Multiple Vitamins-Minerals (OCUVITE) TABS 11/30/2017 Active    Nattokinase 100 MG CAPS 11/30/2017 Active                     ALLERGIES       Allergies   Allergen Reactions   • Aggrenox [Aspirin-Dipyridamole]     • Plavix [Clopidogrel Bisulfate]           PHYSICAL EXAM  VITAL SIGNS:  Pulse 62   Temp 36.7 °C (98 °F)   Resp 19   Wt 89 kg (196 lb 3.4 oz)   SpO2 95%   BMI 28.15 kg/m²   Constitutional: appears stated age, well nourished  HEENT: Atraumatic no otorrhea no rhinorrhea  Neck: Supple, full range of motion without discomfort  Skin: Warm dry no acute rash or lesion  Musculoskeletal: full range of motion all extremities muscle tone unremarkable  Neurologic:   Awake interactive oriented person place situation  Speech fluent appropriate visual fields full to confrontation pupils 3 mm reactive midline conjugate gaze  Tongue uvula midline face symmetric hearing decreased  Bilateral SCM shoulder shrug deltoid bicep tricep wrist extension hand intrinsics IP DF PF EHL thigh adduction thigh abduction 5/5 no pronator drift  Sensation intact touch x 4 extremities torso face  No Hoffmans no clonus  Finger nose finger ILIR unrarkable    Psychiatric: Appropriate affect for situation at this time        DIAGNOSTIC STUDIES / PROCEDURES  LABS        Results for orders placed or performed during the hospital encounter of 02/02/18   CBC WITH DIFFERENTIAL   Result Value Ref Range     WBC 7.9 4.8 - 10.8 K/uL     RBC 4.64 (L) 4.70 - 6.10 M/uL     Hemoglobin 14.1 14.0 - 18.0 g/dL     Hematocrit 44.7 42.0 - 52.0 %     MCV 96.3 81.4 - 97.8 fL     MCH 30.4 27.0 - 33.0 pg     MCHC 31.5 (L) 33.7 - 35.3 g/dL     RDW 47.8 35.9 - 50.0 fL     Platelet Count 168 164 - 446 K/uL     MPV 10.5 9.0 - 12.9 fL     Neutrophils-Polys 76.10 (H) 44.00 - 72.00 %     Lymphocytes 9.60 (L) 22.00 - 41.00 %     Monocytes 10.00 0.00 - 13.40 %     Eosinophils 3.40 0.00 - 6.90 %     Basophils 0.60 0.00 - 1.80 %     Immature Granulocytes 0.30 0.00 - 0.90 %     Nucleated RBC 0.00 /100 WBC     Neutrophils (Absolute) 5.99 1.82 - 7.42 K/uL     Lymphs (Absolute) 0.76 (L) 1.00 - 4.80 K/uL     Monos (Absolute) 0.79 0.00 - 0.85 K/uL     Eos (Absolute) 0.27 0.00 - 0.51 K/uL     Baso (Absolute) 0.05 0.00 - 0.12 K/uL     Immature Granulocytes (abs)  0.02 0.00 - 0.11 K/uL     NRBC (Absolute) 0.00 K/uL   BASIC METABOLIC PANEL   Result Value Ref Range     Sodium 141 135 - 145 mmol/L     Potassium 3.9 3.6 - 5.5 mmol/L     Chloride 109 96 - 112 mmol/L     Co2 27 20 - 33 mmol/L     Glucose 105 (H) 65 - 99 mg/dL     Bun 32 (H) 8 - 22 mg/dL     Creatinine 1.06 0.50 - 1.40 mg/dL     Calcium 8.9 8.5 - 10.5 mg/dL     Anion Gap 5.0 0.0 - 11.9   PROTHROMBIN TIME   Result Value Ref Range     PT 15.0 (H) 12.0 - 14.6 sec     INR 1.21 (H) 0.87 - 1.13   APTT   Result Value Ref Range     APTT 31.8 24.7 - 36.0 sec   ESTIMATED GFR   Result Value Ref Range     GFR If African American >60 >60 mL/min/1.73 m 2     GFR If Non African American >60 >60 mL/min/1.73 m 2         All labs reviewed by me.        RADIOLOGY  Lumbar spine CT:  L4 compression fracture with <20% height loss; diffuse osteopenia, anterolisthesis L5 on S1-degenerative     The radiologist's interpretation of all radiological studies have been reviewed by me.   AP:  84 year old male with L4 osteoporotic compression fracture with <20% height loss.  I recommend TLSO when out of bed.  He can follow up in my clinic in 2-4 weeks with AP lateral lumbar spine xrays.  The L5/S1 listhesis appears degenerative, and is most likely the cause of his leg pain.

## 2018-02-03 NOTE — THERAPY
"Occupational Therapy Evaluation completed.   Functional Status: mod assist level for actiovity  Plan of Care:3 x weekly to focus on ADLs, transfers, safety  Discharge Recommendations:  Equipment: TBD Post-acute therapy -yes    See \"Rehab Therapy-Acute\" Patient Summary Report for complete documentation.    "

## 2018-02-03 NOTE — CARE PLAN
Problem: Venous Thromboembolism (VTW)/Deep Vein Thrombosis (DVT) Prevention:  Goal: Patient will participate in Venous Thrombosis (VTE)/Deep Vein Thrombosis (DVT)Prevention Measures  Outcome: PROGRESSING AS EXPECTED  Heparin gtt administration.

## 2018-02-03 NOTE — CARE PLAN
Problem: Pain Management  Goal: Pain level will decrease to patient's comfort goal  Outcome: PROGRESSING AS EXPECTED  Pain is managed with PRN pain medications.

## 2018-02-03 NOTE — CARE PLAN
Problem: Communication  Goal: The ability to communicate needs accurately and effectively will improve    Intervention: Educate patient and significant other/support system about the plan of care, procedures, treatments, medications and allow for questions  White board updated with day staff and return time.  PT notified of hourly rounding and unit routine.   Appropriate signs in place at doorway for pt.       Problem: Safety  Goal: Will remain free from falls    Intervention: Implement fall precautions  Pt calls appropriately, treaded socks in use. Personal belongings and call light with in reach and bed is locked in lowest position.  Hourly rounding in place

## 2018-02-03 NOTE — THERAPY
"Physical Therapy Evaluation completed.   Bed Mobility:  Supine to Sit: Moderate Assist  Transfers: Sit to Stand: Moderate Assist  Gait: Level Of Assist: Contact Guard Assist with Front-Wheel Walker       Plan of Care: Will benefit from Physical Therapy 5 times per week  Discharge Recommendations: Equipment: Will Continue to Assess for Equipment Needs. Post-acute therapy Discharge to a transitional care facility for continued skilled therapy services. and Discharge to home with outpatient or home health for additional skilled therapy services.    Pt presents to acute PT s/p Lx compression fx and complicated co-morbid medical hx including DVT and general debilitation. Pt tolerating PT eval and mobility but requires close CGA to Max A with ambulation, transfers, donning/doffing TLSO, and bed mobility. Pt will continue to benefit from skilled acute PT to address impairments and assist with D/C planning.    See \"Rehab Therapy-Acute\" Patient Summary Report for complete documentation.     "

## 2018-02-03 NOTE — PROGRESS NOTES
Neurosurgery Progress Note    Subjective:  NSX  L4 compression fx  Likely degenerative spondylolysthesis at L4-L5  Resting comfortably in bed  LBP controlled  Pain increases when standing  Will have pain radiating down back of leg to knee when up  Voiding  Has TLSO at bedside    Exam:  LE motor 5/5 throughout RLE  Left LE 4/5 strength thoughout  Labs stable   VSS    BP  Min: 142/51  Max: 166/54  Pulse  Av  Min: 61  Max: 68  Resp  Av  Min: 16  Max: 16  Temp  Av.8 °C (98.2 °F)  Min: 36.5 °C (97.7 °F)  Max: 37.3 °C (99.2 °F)  SpO2  Av.3 %  Min: 90 %  Max: 95 %    No Data Recorded    Recent Labs      18   03418   0536   WBC  7.9  8.0   RBC  4.64*  4.66*   HEMOGLOBIN  14.1  14.4   HEMATOCRIT  44.7  45.2   MCV  96.3  97.0   MCH  30.4  30.9   MCHC  31.5*  31.9*   RDW  47.8  48.1   PLATELETCT  168  164   MPV  10.5  10.8     Recent Labs      18   0342  18   0537   SODIUM  141  141   POTASSIUM  3.9  4.0   CHLORIDE  109  107   CO2  27  25   GLUCOSE  105*  110*   BUN  32*  32*     Recent Labs      18   0342  18   1621  18   2258  18   0536   APTT  31.8  39.6*  96.6*  78.4*   INR  1.21*   --    --    --            Intake/Output       18 07 - 18 0659 18 07 - 18 0659      [id number] Total 1528-2205 [id number] Total       Intake    Total Intake -- -- -- -- -- --       Output    Urine  --  600 600  --  -- --    Void (ml) -- 600 600 -- -- --    Total Output -- 600 600 -- -- --       Net I/O     -- -600 -600 -- -- --            Intake/Output Summary (Last 24 hours) at 18 0857  Last data filed at 18 0400   Gross per 24 hour   Intake                0 ml   Output              600 ml   Net             -600 ml            • senna-docusate  2 Tab BID    And   • polyethylene glycol/lytes  1 Packet QDAY PRN    And   • magnesium hydroxide  30 mL QDAY PRN    And   • bisacodyl  10 mg QDAY PRN   • Respiratory Care per Protocol    Continuous RT   • acetaminophen  650 mg Q6HRS PRN   • Pharmacy Consult Request   PRN    And   • oxyCODONE immediate-release  2.5 mg Q3HRS PRN    And   • oxyCODONE immediate-release  5 mg Q3HRS PRN    And   • morphine injection  2 mg Q3HRS PRN   • heparin  3,200 Units PRN    And   • heparin   Continuous       Assessment and Plan:  Hospital day #2    Plan:  1. Ok to mobilize in TLSO brace   2. PT/OT  3. Non surgical management of L4 compression fx at this time

## 2018-02-03 NOTE — PROGRESS NOTES
Up to commode with walker and 2 people. Weak.  Had gas only.  PT came to size TLSO and eval.  Skin intact. Family in swelling of both feet but r is greater. On Heparin for   DVT.  Takes organic supp instead of blood thinner at home.  VS stable. LBM 2/1. Eating well.  Must set up .  Macular degeneration.

## 2018-02-03 NOTE — PROGRESS NOTES
Assumed care of patient following shift report. Patient is A&Ox4. He is resting in bed. At this time he denies pain. Patient has heparin drip running at 1450u/29ml per hour. Patient is in no acute distress. He is comfortable at this time. CNAs assisted with repositioning. No other needs at this time. Plan of care reviewed. Call device in reach. Will continue to monitor.

## 2018-02-03 NOTE — PROGRESS NOTES
[facility] Hospitalist Progress Note    Date of Service: 2/3/2018    Chief Complaint  84 y.o. male admitted 2018 with outlying facility with low back pain and LLE DVT on heparin drip.  U/S LLE with partial occlusion thrombus LLE which may be chronic.    Interval Problem Update  2/3:  Has TLSO in place, spoke to patient about his high risk for worsening thrombus in LLE due to recent L4 compression fracture, understands high risk for PE.  Agrees to po AC, just not coumadin.  Started Eliquis, dc'd heparin drip.  No surgery planned per NS.  Continue home med cefdinir per Dr. Ko ID to prevent recurrence of rt leg infection.     Consultants/Specialty  Devan:  L4 compression fx, nonsurgical, TLSO brace.    Disposition  PT/OT rec SNF.  SNF order placed.  TLSO with ambulation.  Lives with daughter in [place].        Review of Systems   Constitutional: Negative for chills, diaphoresis, fever and malaise/fatigue.   HENT: Negative for congestion and sore throat.    Eyes: Negative for pain and discharge.   Respiratory: Negative for cough, hemoptysis, sputum production, shortness of breath and wheezing.    Cardiovascular: Negative for chest pain, palpitations, claudication and leg swelling.   Gastrointestinal: Negative for abdominal pain, constipation, diarrhea, melena, nausea and vomiting.   Genitourinary: Negative for dysuria, frequency and urgency.   Musculoskeletal: Positive for back pain. Negative for joint pain, myalgias and neck pain.   Skin: Negative for itching and rash.   Neurological: Negative for dizziness, sensory change, speech change, focal weakness, loss of consciousness, weakness and headaches.   Endo/Heme/Allergies: Does not bruise/bleed easily.   Psychiatric/Behavioral: Negative for depression, substance abuse and suicidal ideas.      Physical Exam  Laboratory/Imaging   Hemodynamics  Temp (24hrs), Av.8 °C (98.2 °F), Min:36.5 °C (97.7 °F), Max:37.3 °C (99.2 °F)   Temperature: 36.6 °C (97.9 °F)  Pulse   Av.1  Min: 49  Max: 68    Blood Pressure : 142/51      Respiratory      Respiration: 16, Pulse Oximetry: 92 %        RUL Breath Sounds: Clear, RML Breath Sounds: Diminished, RLL Breath Sounds: Diminished, SHIRA Breath Sounds: Clear, LLL Breath Sounds: Diminished    Fluids    Intake/Output Summary (Last 24 hours) at 18 1006  Last data filed at 18 0400   Gross per 24 hour   Intake                0 ml   Output              600 ml   Net             -600 ml       Nutrition  Orders Placed This Encounter   Procedures   • Diet Order     Standing Status:   Standing     Number of Occurrences:   1     Order Specific Question:   Diet:     Answer:   Regular [1]     Physical Exam   Constitutional: He is oriented to person, place, and time. He appears well-developed and well-nourished. No distress.   HENT:   Head: Normocephalic and atraumatic.   Mouth/Throat: Oropharynx is clear and moist. No oropharyngeal exudate.   Eyes: Conjunctivae and EOM are normal. Pupils are equal, round, and reactive to light. Right eye exhibits no discharge. Left eye exhibits no discharge. No scleral icterus.   Neck: Normal range of motion. Neck supple. No JVD present. No tracheal deviation present. No thyromegaly present.   Cardiovascular: Normal rate, regular rhythm and normal heart sounds.  Exam reveals no gallop and no friction rub.    No murmur heard.  Pulmonary/Chest: Effort normal and breath sounds normal. No respiratory distress. He has no wheezes. He has no rales. He exhibits no tenderness.   Lungs CTA b/l   Abdominal: Soft. Bowel sounds are normal. He exhibits no distension and no mass. There is no tenderness. There is no rebound and no guarding.   Musculoskeletal: Normal range of motion. He exhibits no edema or tenderness.   TLSO in place.  Rt LE with chronic hyperpigmentation changes from chronic infections.  LLE w/o edema noted or acute pain.   Lymphadenopathy:     He has no cervical adenopathy.   Neurological: He is alert and  oriented to person, place, and time. No cranial nerve deficit. He exhibits normal muscle tone.   Skin: Skin is warm and dry. No rash noted. He is not diaphoretic. No erythema.   Psychiatric: He has a normal mood and affect. His behavior is normal. Judgment and thought content normal.   Nursing note and vitals reviewed.      Recent Labs      02/02/18 0342 02/03/18   0536   WBC  7.9  8.0   RBC  4.64*  4.66*   HEMOGLOBIN  14.1  14.4   HEMATOCRIT  44.7  45.2   MCV  96.3  97.0   MCH  30.4  30.9   MCHC  31.5*  31.9*   RDW  47.8  48.1   PLATELETCT  168  164   MPV  10.5  10.8     Recent Labs      02/02/18   0342  02/03/18   0537   SODIUM  141  141   POTASSIUM  3.9  4.0   CHLORIDE  109  107   CO2  27  25   GLUCOSE  105*  110*   BUN  32*  32*   CREATININE  1.06  0.88   CALCIUM  8.9  8.6     Recent Labs      02/02/18   0342  02/02/18   1621  02/02/18   2258  02/03/18   0536   APTT  31.8  39.6*  96.6*  78.4*   INR  1.21*   --    --    --                   Assessment/Plan     * Lumbar compression fracture (CMS-HCC)   Assessment & Plan    dr. Hartman:  recommend TLSO, nonsurgical.   Patient receiving symptomatic management for pain   PT/OT rec SNF  L4 compression fx.          Chronic bacterial skin infection   Assessment & Plan    States see ID Dr. Ko and on cefdinir for chronic rt leg infection.  Hyperpigmentation seen RLE likely from repeat infections.  No acute infection seen, restarted home cefdinir.        Chronic deep vein thrombosis (DVT) of femoral vein of left lower extremity (CMS-HCC)- (present on admission)   Assessment & Plan    Patient was sent from an outlying facility with a diagnosis of deep venous thrombosis however review of the records sent from the facility notes that a left lower extremity ultrasound showed a partial occlusion thrombus of the left mid superficial femoral vein.    hypercoagulability panel to assess for factor V Leyden deficiency, as well as protein C and protein S deficiency given his  family history of blood clots.  2/3:  Discussion of high risk of blood clots given new L4 compression fx, agreeable to Eliquis po.  dc'd heparin drip, normal renal function.  He has had 3 dVTs in his lifetime and now has partial occlusion thrombus of left mid superficial femoral vein.  On bloodless program, states will never start coumadin again, has been on it x3 times for lengthy periods of time.        S/P aortic valve replacement- (present on admission)   Assessment & Plan    Patient is followed by Dr. Collins of cardiology, the patient follow-up as needed once medically stable for discharge.          Quality-Core Measures

## 2018-02-04 PROBLEM — I10 ESSENTIAL HYPERTENSION: Status: ACTIVE | Noted: 2018-02-04

## 2018-02-04 LAB
CARDIOLIPIN IGA SER IA-ACNC: 0 APL (ref 0–11)
CARDIOLIPIN IGG SER IA-ACNC: 2 GPL (ref 0–14)
CARDIOLIPIN IGM SER IA-ACNC: 4 MPL (ref 0–12)
PROT C ACT/NOR PPP: 100 % (ref 83–168)
PROT S ACT/NOR PPP: 78 % (ref 66–143)

## 2018-02-04 PROCEDURE — A9270 NON-COVERED ITEM OR SERVICE: HCPCS | Performed by: HOSPITALIST

## 2018-02-04 PROCEDURE — 99232 SBSQ HOSP IP/OBS MODERATE 35: CPT | Performed by: HOSPITALIST

## 2018-02-04 PROCEDURE — 700102 HCHG RX REV CODE 250 W/ 637 OVERRIDE(OP): Performed by: HOSPITALIST

## 2018-02-04 PROCEDURE — 770006 HCHG ROOM/CARE - MED/SURG/GYN SEMI*

## 2018-02-04 RX ORDER — LISINOPRIL 2.5 MG/1
5 TABLET ORAL
Status: DISCONTINUED | OUTPATIENT
Start: 2018-02-04 | End: 2018-02-05

## 2018-02-04 RX ADMIN — CEFDINIR 300 MG: 300 CAPSULE ORAL at 20:30

## 2018-02-04 RX ADMIN — CEFDINIR 300 MG: 300 CAPSULE ORAL at 08:24

## 2018-02-04 RX ADMIN — OXYCODONE HYDROCHLORIDE 5 MG: 5 TABLET ORAL at 11:13

## 2018-02-04 RX ADMIN — OXYCODONE HYDROCHLORIDE 5 MG: 5 TABLET ORAL at 06:58

## 2018-02-04 RX ADMIN — OXYCODONE HYDROCHLORIDE 5 MG: 5 TABLET ORAL at 01:19

## 2018-02-04 RX ADMIN — OXYCODONE HYDROCHLORIDE 5 MG: 5 TABLET ORAL at 20:30

## 2018-02-04 RX ADMIN — ACETAMINOPHEN 650 MG: 325 TABLET, FILM COATED ORAL at 08:24

## 2018-02-04 RX ADMIN — APIXABAN 10 MG: 5 TABLET, FILM COATED ORAL at 20:30

## 2018-02-04 RX ADMIN — APIXABAN 10 MG: 5 TABLET, FILM COATED ORAL at 08:24

## 2018-02-04 ASSESSMENT — ENCOUNTER SYMPTOMS
NAUSEA: 0
NECK PAIN: 0
SPEECH CHANGE: 0
EYE PAIN: 0
PALPITATIONS: 0
MYALGIAS: 0
BACK PAIN: 1
BRUISES/BLEEDS EASILY: 0
VOMITING: 0
DIARRHEA: 0
CHILLS: 0
CLAUDICATION: 0
SPUTUM PRODUCTION: 0
WEAKNESS: 0
ABDOMINAL PAIN: 0
DIZZINESS: 0
SENSORY CHANGE: 0
COUGH: 0
HEMOPTYSIS: 0
DEPRESSION: 0
LOSS OF CONSCIOUSNESS: 0
EYE DISCHARGE: 0
DIAPHORESIS: 0
SHORTNESS OF BREATH: 0
CONSTIPATION: 0
WHEEZING: 0
FEVER: 0
SORE THROAT: 0
HEADACHES: 0
FOCAL WEAKNESS: 0

## 2018-02-04 ASSESSMENT — PAIN SCALES - GENERAL
PAINLEVEL_OUTOF10: 7
PAINLEVEL_OUTOF10: 3
PAINLEVEL_OUTOF10: 7
PAINLEVEL_OUTOF10: 5
PAINLEVEL_OUTOF10: 3
PAINLEVEL_OUTOF10: 3
PAINLEVEL_OUTOF10: 5
PAINLEVEL_OUTOF10: 7
PAINLEVEL_OUTOF10: 3

## 2018-02-04 ASSESSMENT — LIFESTYLE VARIABLES: SUBSTANCE_ABUSE: 0

## 2018-02-04 NOTE — DISCHARGE PLANNING
Medical Social Work    Per pts chart, pt needs a SNF referral. SW met with pt at bedside who requested that referrals be sent to [facility]. SW addressed all questions and encouraged follow up as needed. SW sent the choice form to [facility] and confirmed receipt of choice form. SW to monitor response status and follow up with care team.

## 2018-02-04 NOTE — ASSESSMENT & PLAN NOTE
States see ID Dr. Ko and on cefdinir for chronic rt leg infection.  Hyperpigmentation seen RLE likely from repeat infections.  No acute infection seen, restarted home cefdinir.

## 2018-02-04 NOTE — CARE PLAN
Problem: Safety  Goal: Will remain free from injury  Outcome: PROGRESSING AS EXPECTED  Pt educated on need to call before getting up. Pt verbalizes understanding. Room safety check. DME in bathroom.Treaded socks on pt. Bed in lowest position. Pt calls for help appropriately on call light. Call light with in reach. Hourly rounding in place.              Problem: Bowel/Gastric:  Goal: Normal bowel function is maintained or improved  Outcome: PROGRESSING AS EXPECTED  Pt able to have a bowel movement 2/3. Bowel sounds normoactive in all four quadrants.

## 2018-02-04 NOTE — DISCHARGE PLANNING
Received choice form from [facility] at 1056.  Referral sent to [facility] at 1106 on 02-04-18.

## 2018-02-04 NOTE — PROGRESS NOTES
Assumed care of pt 1845. Pt resting in bed. Upon meeting pt heparin drip was discontinued with day shift nurse. Reviewed POC including safety, mobility, pain management, medication administration, DVT prophylaxis, and discharge. Hospitalist called to confirm SCD order was contraindicated as pt has a DVT. Hospitalist stated SCDS were unnecessary. Call light within reach. Pt calls appropriately. Hourly rounding in place. Pt has no needs or concerns at this time.

## 2018-02-04 NOTE — ASSESSMENT & PLAN NOTE
Patient was sent from an outlying facility with a diagnosis of deep venous thrombosis however review of the records sent from the facility notes that a left lower extremity ultrasound showed a partial occlusion thrombus of the left mid superficial femoral vein.    hypercoagulability panel to assess for factor V Leyden deficiency, as well as protein C and protein S deficiency given his family history of blood clots.  2/3:  Discussion of high risk of blood clots given new L4 compression fx, agreeable to Eliquis po.  dc'd heparin drip, normal renal function.  He has had 3 dVTs in his lifetime and now has partial occlusion thrombus of left mid superficial femoral vein.  On bloodless program, states will never start coumadin again, has been on it x3 times for lengthy periods of time.

## 2018-02-04 NOTE — PROGRESS NOTES
Received shift report from noc RN and assumed care of this pt at 0715. Pt AOx4, calme, resting in bed. Declines to sit up for breakfast 2/2 increase in pain. Pt reports pain is 5/10 to mid back, medicated prn per MAR. Pt is up 2 assist +FWW and TLSO brace. Does call appropriately. Bed alarm is off per pt refusal. PIV assessed and is patent, CDI, SL. Pt is on RA with SaO2 >90%. Pt states priority this shift is pain management. Discussed POC for multimodal pain management, medications, mobility, day time routine, comfort, and safety. Patient has call light and personal belongings within reach. Safety and fall precautions in place. Reviewed orders, notes, labs, and test results. Hourly rounding in place with RN rounding on odd hours and CNA on even hours.

## 2018-02-05 LAB
25(OH)D3 SERPL-MCNC: 10 NG/ML (ref 30–100)
ANION GAP SERPL CALC-SCNC: 4 MMOL/L (ref 0–11.9)
BASOPHILS # BLD AUTO: 0.6 % (ref 0–1.8)
BASOPHILS # BLD: 0.04 K/UL (ref 0–0.12)
BUN SERPL-MCNC: 26 MG/DL (ref 8–22)
CALCIUM SERPL-MCNC: 8.6 MG/DL (ref 8.5–10.5)
CHLORIDE SERPL-SCNC: 104 MMOL/L (ref 96–112)
CO2 SERPL-SCNC: 30 MMOL/L (ref 20–33)
CREAT SERPL-MCNC: 0.94 MG/DL (ref 0.5–1.4)
EOSINOPHIL # BLD AUTO: 0.53 K/UL (ref 0–0.51)
EOSINOPHIL NFR BLD: 7.9 % (ref 0–6.9)
ERYTHROCYTE [DISTWIDTH] IN BLOOD BY AUTOMATED COUNT: 46.9 FL (ref 35.9–50)
GLUCOSE SERPL-MCNC: 101 MG/DL (ref 65–99)
HCT VFR BLD AUTO: 44.2 % (ref 42–52)
HGB BLD-MCNC: 14.1 G/DL (ref 14–18)
IMM GRANULOCYTES # BLD AUTO: 0.02 K/UL (ref 0–0.11)
IMM GRANULOCYTES NFR BLD AUTO: 0.3 % (ref 0–0.9)
LYMPHOCYTES # BLD AUTO: 0.86 K/UL (ref 1–4.8)
LYMPHOCYTES NFR BLD: 12.9 % (ref 22–41)
MCH RBC QN AUTO: 30.6 PG (ref 27–33)
MCHC RBC AUTO-ENTMCNC: 31.9 G/DL (ref 33.7–35.3)
MCV RBC AUTO: 95.9 FL (ref 81.4–97.8)
MONOCYTES # BLD AUTO: 0.8 K/UL (ref 0–0.85)
MONOCYTES NFR BLD AUTO: 12 % (ref 0–13.4)
NEUTROPHILS # BLD AUTO: 4.43 K/UL (ref 1.82–7.42)
NEUTROPHILS NFR BLD: 66.3 % (ref 44–72)
NRBC # BLD AUTO: 0 K/UL
NRBC BLD-RTO: 0 /100 WBC
PLATELET # BLD AUTO: 194 K/UL (ref 164–446)
PMV BLD AUTO: 10.5 FL (ref 9–12.9)
POTASSIUM SERPL-SCNC: 4.2 MMOL/L (ref 3.6–5.5)
RBC # BLD AUTO: 4.61 M/UL (ref 4.7–6.1)
SODIUM SERPL-SCNC: 138 MMOL/L (ref 135–145)
WBC # BLD AUTO: 6.7 K/UL (ref 4.8–10.8)

## 2018-02-05 PROCEDURE — 80048 BASIC METABOLIC PNL TOTAL CA: CPT

## 2018-02-05 PROCEDURE — 85025 COMPLETE CBC W/AUTO DIFF WBC: CPT

## 2018-02-05 PROCEDURE — 770006 HCHG ROOM/CARE - MED/SURG/GYN SEMI*

## 2018-02-05 PROCEDURE — 82306 VITAMIN D 25 HYDROXY: CPT

## 2018-02-05 PROCEDURE — 700102 HCHG RX REV CODE 250 W/ 637 OVERRIDE(OP): Performed by: HOSPITALIST

## 2018-02-05 PROCEDURE — A9270 NON-COVERED ITEM OR SERVICE: HCPCS | Performed by: HOSPITALIST

## 2018-02-05 PROCEDURE — 99232 SBSQ HOSP IP/OBS MODERATE 35: CPT | Performed by: HOSPITALIST

## 2018-02-05 PROCEDURE — 36415 COLL VENOUS BLD VENIPUNCTURE: CPT

## 2018-02-05 RX ORDER — AMLODIPINE BESYLATE 5 MG/1
5 TABLET ORAL
Status: DISCONTINUED | OUTPATIENT
Start: 2018-02-05 | End: 2018-02-06 | Stop reason: HOSPADM

## 2018-02-05 RX ORDER — GABAPENTIN 100 MG/1
100 CAPSULE ORAL 3 TIMES DAILY
Status: DISCONTINUED | OUTPATIENT
Start: 2018-02-05 | End: 2018-02-06 | Stop reason: HOSPADM

## 2018-02-05 RX ADMIN — OXYCODONE HYDROCHLORIDE 5 MG: 5 TABLET ORAL at 21:21

## 2018-02-05 RX ADMIN — ACETAMINOPHEN 650 MG: 325 TABLET, FILM COATED ORAL at 08:26

## 2018-02-05 RX ADMIN — VITAMIN D, TAB 1000IU (100/BT) 2000 UNITS: 25 TAB at 09:48

## 2018-02-05 RX ADMIN — OXYCODONE HYDROCHLORIDE 2.5 MG: 5 TABLET ORAL at 08:21

## 2018-02-05 RX ADMIN — OXYCODONE HYDROCHLORIDE 5 MG: 5 TABLET ORAL at 03:31

## 2018-02-05 RX ADMIN — CEFDINIR 300 MG: 300 CAPSULE ORAL at 21:21

## 2018-02-05 RX ADMIN — APIXABAN 10 MG: 5 TABLET, FILM COATED ORAL at 21:21

## 2018-02-05 RX ADMIN — OXYCODONE HYDROCHLORIDE 5 MG: 5 TABLET ORAL at 17:22

## 2018-02-05 RX ADMIN — STANDARDIZED SENNA CONCENTRATE AND DOCUSATE SODIUM 2 TABLET: 8.6; 5 TABLET, FILM COATED ORAL at 08:24

## 2018-02-05 RX ADMIN — GABAPENTIN 100 MG: 100 CAPSULE ORAL at 21:35

## 2018-02-05 RX ADMIN — APIXABAN 10 MG: 5 TABLET, FILM COATED ORAL at 08:26

## 2018-02-05 RX ADMIN — AMLODIPINE BESYLATE 5 MG: 5 TABLET ORAL at 09:48

## 2018-02-05 RX ADMIN — GABAPENTIN 100 MG: 100 CAPSULE ORAL at 17:22

## 2018-02-05 RX ADMIN — CEFDINIR 300 MG: 300 CAPSULE ORAL at 08:26

## 2018-02-05 RX ADMIN — STANDARDIZED SENNA CONCENTRATE AND DOCUSATE SODIUM 2 TABLET: 8.6; 5 TABLET, FILM COATED ORAL at 21:22

## 2018-02-05 ASSESSMENT — ENCOUNTER SYMPTOMS
BACK PAIN: 1
MYALGIAS: 0
CHILLS: 0
VOMITING: 0
BRUISES/BLEEDS EASILY: 0
DIZZINESS: 0
CONSTIPATION: 0
SORE THROAT: 0
EYE DISCHARGE: 0
SENSORY CHANGE: 0
WEAKNESS: 0
DEPRESSION: 0
COUGH: 0
FEVER: 0
WHEEZING: 0
CLAUDICATION: 0
EYE PAIN: 0
SPEECH CHANGE: 0
PALPITATIONS: 0
DIAPHORESIS: 0
NAUSEA: 0
DIARRHEA: 0
FOCAL WEAKNESS: 0
SHORTNESS OF BREATH: 0
ABDOMINAL PAIN: 0
HEMOPTYSIS: 0
NECK PAIN: 0
LOSS OF CONSCIOUSNESS: 0
HEADACHES: 0
SPUTUM PRODUCTION: 0

## 2018-02-05 ASSESSMENT — PAIN SCALES - GENERAL
PAINLEVEL_OUTOF10: 4
PAINLEVEL_OUTOF10: 3
PAINLEVEL_OUTOF10: 5
PAINLEVEL_OUTOF10: 5
PAINLEVEL_OUTOF10: 3
PAINLEVEL_OUTOF10: 5
PAINLEVEL_OUTOF10: 2

## 2018-02-05 ASSESSMENT — LIFESTYLE VARIABLES: SUBSTANCE_ABUSE: 0

## 2018-02-05 NOTE — PROGRESS NOTES
Received shift report from noc RN and assumed care of this pt at 0715. Pt AOx4, calm, resting in bed. Pt reports pain is 4/10 to mid back, recently medicated prn per MAR. Pt is up 2-3 assist +FWW & TLSO, generalized weakness, unsteady. Does call appropriately. Bed alarm is off per pt refusal. PIV assessed and is patent, CDI, SL. Pt is on RA with SaO2 >90%. Pt states priority this shift is DC planning. Discussed POC for DC to SNF (pending acceptance), day time routine, comfort, and safety. Patient has call light and personal belongings within reach. Safety and fall precautions in place. Reviewed orders, notes, labs, and test results. Hourly rounding in place with RN rounding on odd hours and CNA on even hours.

## 2018-02-05 NOTE — DISCHARGE PLANNING
Arranged patients transport to [facility] at 1200 on 02/06.  Allison) notified via voicemail.

## 2018-02-05 NOTE — DISCHARGE PLANNING
Receive call from Madiha at [facility], they have accepted patient but will not have a bed until 02-06-18.

## 2018-02-05 NOTE — DISCHARGE PLANNING
Spoke with Christianne  At [facility], they have accepted patient, pending insurance with [employer] authorization for co-payment days.  Per Christianne she will advise as soon as they have response.  Patient would have a $167.50 per day co payment is declined by insurance.

## 2018-02-05 NOTE — DISCHARGE PLANNING
Received call from Christianne at [facility] they have declined patient due to current staffing.

## 2018-02-05 NOTE — ASSESSMENT & PLAN NOTE
2/4:  Added lisinopril 5mg po daily.  2/5 remains elevated, changed lisinopril to norvasc 5mg daily.

## 2018-02-05 NOTE — CARE PLAN
Problem: Safety  Goal: Will remain free from injury  Outcome: PROGRESSING AS EXPECTED  Bed alarm in use. Safety precautions in place. Call device in reach.    Problem: Bowel/Gastric:  Goal: Normal bowel function is maintained or improved  Outcome: PROGRESSING AS EXPECTED  Stool softeners available to patient. Patient had large BM 2/4

## 2018-02-05 NOTE — DISCHARGE PLANNING
Received SNF choice from [facility]) at 1246.  Skilled Nursing referral sent to [facility] at 1313.

## 2018-02-05 NOTE — DISCHARGE PLANNING
SW spoke to pt and family at bedside regarding local SNF choice as [facility] is unable to accept. Pt/family would like a referral sent to [facility]. Choice form completed and faxed to [facility].

## 2018-02-05 NOTE — PROGRESS NOTES
[facility] Hospitalist Progress Note    Date of Service: 2/4/2018    Chief Complaint  84 y.o. male admitted 2/2/2018 with outlying facility with low back pain and LLE DVT on heparin drip.  U/S LLE with partial occlusion thrombus LLE which may be chronic.    Interval Problem Update  2/3:  Has TLSO in place, spoke to patient about his high risk for worsening thrombus in LLE due to recent L4 compression fracture, understands high risk for PE.  Agrees to po AC, just not coumadin.  Started Eliquis, dc'd heparin drip.  No surgery planned per NS.  Continue home med cefdinir per Dr. Ko ID to prevent recurrence of rt leg infection.   2/4:  Swollen left lower leg, 2/2 to DVT. Up ad fariha with TLSO and staff.  Added lisinopril 5mg po daily for elevated BP.    Consultants/Specialty  Devan:  L4 compression fx, nonsurgical, TLSO brace.    Disposition  PT/OT rec SNF.  SNF order placed.  TLSO with ambulation.  Lives with daughter in [place].        Review of Systems   Constitutional: Negative for chills, diaphoresis, fever and malaise/fatigue.   HENT: Negative for congestion and sore throat.    Eyes: Negative for pain and discharge.   Respiratory: Negative for cough, hemoptysis, sputum production, shortness of breath and wheezing.    Cardiovascular: Negative for chest pain, palpitations, claudication and leg swelling.   Gastrointestinal: Negative for abdominal pain, constipation, diarrhea, melena, nausea and vomiting.   Genitourinary: Negative for dysuria, frequency and urgency.   Musculoskeletal: Positive for back pain. Negative for joint pain, myalgias and neck pain.   Skin: Negative for itching and rash.   Neurological: Negative for dizziness, sensory change, speech change, focal weakness, loss of consciousness, weakness and headaches.   Endo/Heme/Allergies: Does not bruise/bleed easily.   Psychiatric/Behavioral: Negative for depression, substance abuse and suicidal ideas.      Physical Exam  Laboratory/Imaging    Hemodynamics  Temp (24hrs), Av.8 °C (98.3 °F), Min:36.7 °C (98 °F), Max:36.9 °C (98.4 °F)   Temperature:  (Q12 Vitals per RN)  Pulse  Av.5  Min: 49  Max: 80    Blood Pressure : (!) 187/77 (RN Notified)      Respiratory      Respiration: 16, Pulse Oximetry: 90 %        RUL Breath Sounds: Clear, RML Breath Sounds: Diminished, RLL Breath Sounds: Diminished, SHIRA Breath Sounds: Clear, LLL Breath Sounds: Diminished    Fluids    Intake/Output Summary (Last 24 hours) at 18 1630  Last data filed at 18 1100   Gross per 24 hour   Intake             1392 ml   Output             1425 ml   Net              -33 ml       Nutrition  Orders Placed This Encounter   Procedures   • Diet Order     Standing Status:   Standing     Number of Occurrences:   1     Order Specific Question:   Diet:     Answer:   Regular [1]     Physical Exam   Constitutional: He is oriented to person, place, and time. He appears well-developed and well-nourished. No distress.   HENT:   Head: Normocephalic and atraumatic.   Mouth/Throat: Oropharynx is clear and moist. No oropharyngeal exudate.   Eyes: Conjunctivae and EOM are normal. Pupils are equal, round, and reactive to light. Right eye exhibits no discharge. Left eye exhibits no discharge. No scleral icterus.   Neck: Normal range of motion. Neck supple. No JVD present. No tracheal deviation present. No thyromegaly present.   Cardiovascular: Normal rate, regular rhythm and normal heart sounds.  Exam reveals no gallop and no friction rub.    No murmur heard.  Pulmonary/Chest: Effort normal and breath sounds normal. No respiratory distress. He has no wheezes. He has no rales. He exhibits no tenderness.   Lungs CTA b/l   Abdominal: Soft. Bowel sounds are normal. He exhibits no distension and no mass. There is no tenderness. There is no rebound and no guarding.   Musculoskeletal: Normal range of motion. He exhibits no edema or tenderness.   TLSO in place.  Rt LE with chronic  hyperpigmentation changes from chronic infections.  LLE w/o edema noted or acute pain.   Lymphadenopathy:     He has no cervical adenopathy.   Neurological: He is alert and oriented to person, place, and time. No cranial nerve deficit. He exhibits normal muscle tone.   Skin: Skin is warm and dry. No rash noted. He is not diaphoretic. No erythema.   Psychiatric: He has a normal mood and affect. His behavior is normal. Judgment and thought content normal.   Nursing note and vitals reviewed.      Recent Labs      02/02/18   0342  02/03/18   0536   WBC  7.9  8.0   RBC  4.64*  4.66*   HEMOGLOBIN  14.1  14.4   HEMATOCRIT  44.7  45.2   MCV  96.3  97.0   MCH  30.4  30.9   MCHC  31.5*  31.9*   RDW  47.8  48.1   PLATELETCT  168  164   MPV  10.5  10.8     Recent Labs      02/02/18   0342  02/03/18   0537   SODIUM  141  141   POTASSIUM  3.9  4.0   CHLORIDE  109  107   CO2  27  25   GLUCOSE  105*  110*   BUN  32*  32*   CREATININE  1.06  0.88   CALCIUM  8.9  8.6     Recent Labs      02/02/18   0342   02/02/18   2258  02/03/18   0536  02/03/18   1218   APTT  31.8   < >  96.6*  78.4*  71.0*   INR  1.21*   --    --    --    --     < > = values in this interval not displayed.                  Assessment/Plan     * Lumbar compression fracture (CMS-HCC)   Assessment & Plan    dr. Hartman:  recommend TLSO, nonsurgical.   Patient receiving symptomatic management for pain   PT/OT rec SNF  L4 compression fx.          Essential hypertension   Assessment & Plan    2/4:  Added lisinopril 5mg po daily.        Chronic bacterial skin infection   Assessment & Plan    States see ID Dr. Ko and on cefdinir for chronic rt leg infection.  Hyperpigmentation seen RLE likely from repeat infections.  No acute infection seen, restarted home cefdinir.        Chronic deep vein thrombosis (DVT) of femoral vein of left lower extremity (CMS-[facility])- (present on admission)   Assessment & Plan    Patient was sent from an outlying facility with a diagnosis of  deep venous thrombosis however review of the records sent from the facility notes that a left lower extremity ultrasound showed a partial occlusion thrombus of the left mid superficial femoral vein.    hypercoagulability panel to assess for factor V Leyden deficiency, as well as protein C and protein S deficiency given his family history of blood clots.  2/3:  Discussion of high risk of blood clots given new L4 compression fx, agreeable to Eliquis po.  dc'd heparin drip, normal renal function.  He has had 3 dVTs in his lifetime and now has partial occlusion thrombus of left mid superficial femoral vein.  On bloodless program, states will never start coumadin again, has been on it x3 times for lengthy periods of time.        S/P aortic valve replacement- (present on admission)   Assessment & Plan    Patient is followed by Dr. Collins of cardiology, the patient follow-up as needed once medically stable for discharge.          Quality-Core Measures

## 2018-02-05 NOTE — DISCHARGE PLANNING
Transport form faxed to [facility] for transfer to [facility] tomorrow, 2/6.

## 2018-02-06 VITALS
TEMPERATURE: 98.7 F | RESPIRATION RATE: 16 BRPM | BODY MASS INDEX: 28.15 KG/M2 | OXYGEN SATURATION: 92 % | HEART RATE: 85 BPM | WEIGHT: 196.21 LBS | DIASTOLIC BLOOD PRESSURE: 70 MMHG | SYSTOLIC BLOOD PRESSURE: 157 MMHG

## 2018-02-06 LAB
AT III ACT/NOR PPP CHRO: 86 % (ref 76–128)
AT III AG ACT/NOR PPP IA: 76 % (ref 82–136)

## 2018-02-06 PROCEDURE — 700102 HCHG RX REV CODE 250 W/ 637 OVERRIDE(OP): Performed by: HOSPITALIST

## 2018-02-06 PROCEDURE — 99239 HOSP IP/OBS DSCHRG MGMT >30: CPT | Performed by: INTERNAL MEDICINE

## 2018-02-06 PROCEDURE — A9270 NON-COVERED ITEM OR SERVICE: HCPCS | Performed by: HOSPITALIST

## 2018-02-06 RX ORDER — ACETAMINOPHEN 325 MG/1
650 TABLET ORAL EVERY 6 HOURS PRN
Qty: 30 TAB | Refills: 0
Start: 2018-02-06

## 2018-02-06 RX ORDER — OXYCODONE HYDROCHLORIDE 5 MG/1
5 TABLET ORAL EVERY 6 HOURS PRN
Qty: 12 TAB | Refills: 0 | Status: SHIPPED | OUTPATIENT
Start: 2018-02-06 | End: 2018-02-09

## 2018-02-06 RX ORDER — GABAPENTIN 100 MG/1
100 CAPSULE ORAL 3 TIMES DAILY
Qty: 90 CAP
Start: 2018-02-06

## 2018-02-06 RX ORDER — AMOXICILLIN 250 MG
2 CAPSULE ORAL 2 TIMES DAILY
Qty: 30 TAB | Refills: 0
Start: 2018-02-06

## 2018-02-06 RX ORDER — AMLODIPINE BESYLATE 5 MG/1
5 TABLET ORAL DAILY
Qty: 30 TAB
Start: 2018-02-06

## 2018-02-06 RX ADMIN — VITAMIN D, TAB 1000IU (100/BT) 2000 UNITS: 25 TAB at 09:10

## 2018-02-06 RX ADMIN — GABAPENTIN 100 MG: 100 CAPSULE ORAL at 09:10

## 2018-02-06 RX ADMIN — APIXABAN 10 MG: 5 TABLET, FILM COATED ORAL at 09:09

## 2018-02-06 RX ADMIN — AMLODIPINE BESYLATE 5 MG: 5 TABLET ORAL at 09:09

## 2018-02-06 RX ADMIN — OXYCODONE HYDROCHLORIDE 5 MG: 5 TABLET ORAL at 06:23

## 2018-02-06 RX ADMIN — STANDARDIZED SENNA CONCENTRATE AND DOCUSATE SODIUM 2 TABLET: 8.6; 5 TABLET, FILM COATED ORAL at 09:11

## 2018-02-06 RX ADMIN — CEFDINIR 300 MG: 300 CAPSULE ORAL at 09:10

## 2018-02-06 ASSESSMENT — PAIN SCALES - GENERAL: PAINLEVEL_OUTOF10: 0

## 2018-02-06 NOTE — DISCHARGE INSTRUCTIONS
Discharge Instructions    Discharged to other by medical transportation with escort. Discharged via wheelchair, hospital escort: Yes.  Special equipment needed: TLSO    Be sure to schedule a follow-up appointment with your primary care doctor or any specialists as instructed.     Discharge Plan:   Pneumococcal Vaccine Given - only chart on this line when given: Given (See MAR)  Influenza Vaccine Indication: Indicated: 65 years and older  Influenza Vaccine Given - only chart on this line when given: Influenza Vaccine Given (See MAR)    I understand that a diet low in cholesterol, fat, and sodium is recommended for good health. Unless I have been given specific instructions below for another diet, I accept this instruction as my diet prescription.   Other diet: Regular    Special Instructions:   Deep Vein Thrombosis Discharge Instructions    A deep vein thrombosis (DVT) is a blood clot (thrombus) that develops in a deep vein. A DVT is a clot in the deep, larger veins of the leg, arm, or pelvis. These are more dangerous than clots that might form in veins on the surface of the body. Deep vein thrombosis can lead to complications if the clot breaks off and travels in the bloodstream to the lungs.     CAUSES  Blood clots form in a vein for different reasons. Usually several things cause blood clots. They include:   · The flow of blood slows down.   · The inside of the vein is damaged in some way.   · The person has a condition that makes blood clot more easily. These conditions may include:  · Older age (especially over 75 years old).  · Having a history of blood clots.  · Having major or lengthy surgery. Hip surgery is particularly high-risk.   · Breaking a hip or leg.  · Sitting or lying still for a long time.  · Cancer or cancer treatment.  · Having a long, thin tube (catheter) placed inside a vein during a medical procedure.   · Being overweight (obese).  · Pregnancy and childbirth.  · Medicines with  estrogen.  · Smoking.  · Other circulation or heart problems.     SYMPTOMS  When a clot forms, it can either partially or totally block the blood flow in that vein. Symptoms of a DVT can include:  · Swelling of the leg or arm, especially if one side is much worse.  · Warmth and redness of the leg or arm, especially if one side is much worse.   · Pain in an arm or leg. If the clot is in the leg, symptoms may be more noticeable or worse when standing or walking.  If the blood clot travels to the lung, it may cause:  · Shortness of breath.  · Chest pain. The pain may be worsened by deep breaths.   · Coughing up thick mucus (phlegm), possibly flecked with blood.   Anyone with these symptoms should get emergency medical treatment right away. Call your local emergency  Services (911 in [place] if you have these symptoms.     DIAGNOSIS  If a DVT is suspected, your caregiver will take a full medical history. He or she will also perform a physical exam. Tests that also may be required include:   · Studies of the clotting properties of the blood.   · An ultrasound scan.   · X-rays to show the flow of blood when special dye is injected into the veins (venography).   · Studies of your lungs if you have any chest symptoms.     PREVENTION  · Exercise the legs regularly. Take a brisk 30 minute walk every day.   · Maintain a weight that is appropriate for your height.  · Avoid sitting or lying in bed for long periods of time without moving your legs.   · Women, particularly those over the age of 35, should consider the risks and benefits of taking estrogen medicine, including birth control pills.   · Do not smoke, especially if you take estrogen medicines.   · Long-distance travel can increase your risk. You should exercise your legs by walking or pumping the muscles every hour.   · In hospital prevention: Prevention may include medical and non medical measures.     TREATMENT  · The most common treatment for DVT is blood thinning  (anticoagulant) medicine, which reduces the blood's tendency to clot. Anticoagulants can stop new blood clots from forming and old ones from growing. They cannot dissolve existing clots. Your body does this by itself over time. Anticoagulants can be given by mouth, by intravenous (IV) access, or by injection. Your caregiver will determine the best program for you.   · Less commonly, clot-dissolving drugs (thrombolytics) are used to dissolve a DVT. They carry a high risk of bleeding, so they are used mainly in severe cases.   · Very rarely, a blood clot in the leg needs to be removed surgically.   · If you are unable to take anticoagulants, your caregiver may arrange for you to have a filter placed in a main vein in your belly (abdomen). This filter prevents clots from traveling to your lungs.     HOME CARE INSTRUCTIONS  Take all medicines prescribed by your caregiver. Follow the directions carefully.   · You will most likely continue taking anticoagulants after you leave the hospital. Your caregiver will advise you on the length of treatment (usually 3 to 5 months, sometimes for life).   · Taking too much or too little of an anticoagulant is dangerous. While taking this type of medicine, you will need to have regular blood tests to be sure the dose is correct. The dose can change for many reasons. It is critically important that you take this medicine exactly as prescribed, and that you have blood tests exactly as directed.   · Many foods can interfere with anticoagulants. These include foods high in vitamin K, such as spinach, kale, broccoli, cabbage, angelia and turnip greens, Tripp sprouts, peas, cauliflower, seaweed, parsley, beef and pork liver, green tea, and soybean oil. Your caregiver should discuss limits on these foods with you or you should arrange a visit with a dietician to answer your questions.   · Many medicines can interfere with anticoagulants. You must tell your caregiver about any and all  medicines you take. This includes all vitamins and supplements. Be especially cautious with aspirin and anti-inflammatory medicines. Ask your caregiver before taking these.   · Anticoagulants can have side effects, mostly excessive bruising or bleeding. You will need to hold pressure over cuts for longer than usual. Avoid alcoholic drinks or consume only very small amounts while taking this medicine.    If you are taking an anticoagulant:  · Wear a medical alert bracelet.  · Notify your dentist or other caregivers before procedures.  · Avoid contact sports.    · Ask your caregiver how soon you can go back to normal activities. Not being active can lead to new clots. Ask for a list of what you should and should not do.   · Exercise your lower leg muscles. This is important while traveling.   · You may need to wear compression stockings. These are tight elastic stockings that apply pressure to the lower legs. This can help keep the blood in the legs from clotting.   · If you are a smoker, you should quit.   · Learn as much as you can about DVT.     SEEK MEDICAL CARE IF:  · You have unusual bruising or any bleeding problems.  · The swelling or pain in your affected arm or leg is not gradually improving.   · You anticipate surgery or long-distance travel. You should get specific advice on DVT prevention.   · You discover other family members with blood clots. This may require further testing for inherited diseases or conditions.     SEEK IMMEDIATE MEDICAL CARE IF:  · You develop chest pain.  · You develop severe shortness of breath.  · You begin to cough up bloody mucus or phlegm (sputum).  · You feel dizzy or faint.   · You develop swelling or pain in the leg.  · You have breathing problems after traveling.    MAKE SURE YOU:  · Understand these instructions.  · Will watch your condition.  · Will get help right away if you are not doing well or get worse.       · Is patient discharged on Warfarin / Coumadin?   No      Apixaban (Eliquis) oral tablets  What is this medicine?  APIXABAN is an anticoagulant (blood thinner). It is used to lower the chance of stroke in people with a medical condition called atrial fibrillation. It is also used after knee or hip surgeries to prevent blood clots.  This medicine may be used for other purposes; ask your health care provider or pharmacist if you have questions.  COMMON BRAND NAME(S): Eliquis  What should I tell my health care provider before I take this medicine?  They need to know if you have any of these conditions:  -bleeding disorders  -bleeding in the brain  -blood in your stools (black or tarry stools) or if you have blood in your vomit  -history of stomach bleeding  -kidney disease  -liver disease  -mechanical heart valve  -an unusual or allergic reaction to apixaban, other medicines, foods, dyes, or preservatives  -pregnant or trying to get pregnant  -breast-feeding  How should I use this medicine?  Take this medicine by mouth with a glass of water. Follow the directions on the prescription label. You can take it with or without food. If it upsets your stomach, take it with food. Take your medicine at regular intervals. Do not take it more often than directed. Do not stop taking except on your doctor's advice.  Talk to your pediatrician regarding the use of this medicine in children. Special care may be needed.  Overdosage: If you think you've taken too much of this medicine contact a poison control center or emergency room at once.  Overdosage: If you think you have taken too much of this medicine contact a poison control center or emergency room at once.  NOTE: This medicine is only for you. Do not share this medicine with others.  What if I miss a dose?  If you miss a dose, take it as soon as you can. If it is almost time for your next dose, take only that dose. Do not take double or extra doses.  What may interact with this medicine?  This medicine may interact with the  following:  -aspirin and aspirin-like medicines  -certain medicines for fungal infections like ketoconazole and itraconazole  -certain medicines for seizures like carbamazepine and phenytoin  -certain medicines that treat or prevent blood clots like warfarin, enoxaparin, and dalteparin  -clarithromycin  -NSAIDs, medicines for pain and inflammation, like ibuprofen or naproxen  -rifampin  -ritonavir  -Abrahan's wort  This list may not describe all possible interactions. Give your health care provider a list of all the medicines, herbs, non-prescription drugs, or dietary supplements you use. Also tell them if you smoke, drink alcohol, or use illegal drugs. Some items may interact with your medicine.  What should I watch for while using this medicine?  Do not stop taking this medicine without first talking to your doctor. Stopping this medicine may increase your risk of having a stroke. Be sure to refill your prescription before you run out of medicine.  This medicine may increase your risk to bruise or bleed. Call your doctor or health care professional if you notice any unusual bleeding.  Be careful brushing and flossing your teeth or using a toothpick because you may bleed more easily. If you have any dental work done, tell your dentist you are receiving this medicine.  What side effects may I notice from receiving this medicine?  Side effects that you should report to your doctor or health care professional as soon as possible:  -allergic reactions like skin rash, itching or hives, swelling of the face, lips, or tongue  -bloody or black, tarry stools  -changes in vision  -confusion, trouble speaking or understanding  -red or dark-brown urine  -red spots on the skin  -severe headaches  -spitting up blood or brown material that looks like coffee grounds  -sudden numbness or weakness of the face, arm or leg  -trouble walking, dizziness, loss of balance or coordination  -unusual bruising or bleeding from the eye, gums,  or nose  This list may not describe all possible side effects. Call your doctor for medical advice about side effects. You may report side effects to FDA at 7-161-JBX-5672.  Where should I keep my medicine?  Keep out of the reach of children.  Store at room temperature between 20 and 25 degrees C (68 and 77 degrees F). Throw away any unused medicine after the expiration date.  NOTE: This sheet is a summary. It may not cover all possible information. If you have questions about this medicine, talk to your doctor, pharmacist, or health care provider.  © 2014, [employer]/Gold Standard. (3/17/2014 3:30:42 PM)      Depression / Suicide Risk    As you are discharged from this [facility] facility, it is important to learn how to keep safe from harming yourself.    Recognize the warning signs:  · Abrupt changes in personality, positive or negative- including increase in energy   · Giving away possessions  · Change in eating patterns- significant weight changes-  positive or negative  · Change in sleeping patterns- unable to sleep or sleeping all the time   · Unwillingness or inability to communicate  · Depression  · Unusual sadness, discouragement and loneliness  · Talk of wanting to die  · Neglect of personal appearance   · Rebelliousness- reckless behavior  · Withdrawal from people/activities they love  · Confusion- inability to concentrate     If you or a loved one observes any of these behaviors or has concerns about self-harm, here's what you can do:  · Talk about it- your feelings and reasons for harming yourself  · Remove any means that you might use to hurt yourself (examples: pills, rope, extension cords, firearm)  · Get professional help from the community (Mental Health, Substance Abuse, psychological counseling)  · Do not be alone:Call your Safe Contact- someone whom you trust who will be there for you.  · Call your local CRISIS HOTLINE 594-8323 or 633-112-9295  · Call your local Children's Mobile Crisis  Response Team [facility] (906) 735-0262 or www.Mismi.Neronote  · Call the toll free National Suicide Prevention Hotlines   · National Suicide Prevention Lifeline 861-688-XBDF (4596)  · National Hope Line Network 800-SUICIDE (254-1100)

## 2018-02-06 NOTE — CARE PLAN
Problem: Safety  Goal: Will remain free from falls    Intervention: Implement fall precautions  Bed is in the lowest position, call light and belongings are within reach, treaded socks are on, DME is in the bathroom, bed alarm is on, rails are up, and hourly rounding is in place.       Problem: Pain Management  Goal: Pain level will decrease to patient's comfort goal    Intervention: Follow pain managment plan developed in collaboration with patient and Interdisciplinary Team  Pt complained of 4/10 mid-level back pain. 5 mg of oxycodone was given, per active order. Pt is now sleeping and appears comfortable.

## 2018-02-06 NOTE — PROGRESS NOTES
[facility] Hospitalist Progress Note    Date of Service: 2/5/2018    Chief Complaint  84 y.o. male admitted 2/2/2018 with outlying facility with low back pain and LLE DVT on heparin drip.  U/S LLE with partial occlusion thrombus LLE which may be chronic.    Interval Problem Update  2/3:  Has TLSO in place, spoke to patient about his high risk for worsening thrombus in LLE due to recent L4 compression fracture, understands high risk for PE.  Agrees to po AC, just not coumadin.  Started Eliquis, dc'd heparin drip.  No surgery planned per NS.  Continue home med cefdinir per Dr. Ko ID to prevent recurrence of rt leg infection.   2/4:  Swollen left lower leg, 2/2 to DVT. Up ad fariha with TLSO and staff.  Added lisinopril 5mg po daily for elevated BP.  2/5:  Remains 3 person assist due to back pain, BP elevated, started norvasc instead of lisinopril.  Daughter at bedside:  Unable to care for father at home in current state, agrees with SNF.    Consultants/Specialty  Devan:  L4 compression fx, nonsurgical, TLSO brace.    Disposition  PT/OT rec SNF.  SNF order placed.  TLSO with ambulation.  Lives with daughter in [place].        Review of Systems   Constitutional: Negative for chills, diaphoresis, fever and malaise/fatigue.   HENT: Negative for congestion and sore throat.    Eyes: Negative for pain and discharge.   Respiratory: Negative for cough, hemoptysis, sputum production, shortness of breath and wheezing.    Cardiovascular: Negative for chest pain, palpitations, claudication and leg swelling.   Gastrointestinal: Negative for abdominal pain, constipation, diarrhea, melena, nausea and vomiting.   Genitourinary: Negative for dysuria, frequency and urgency.   Musculoskeletal: Positive for back pain. Negative for joint pain, myalgias and neck pain.   Skin: Negative for itching and rash.   Neurological: Negative for dizziness, sensory change, speech change, focal weakness, loss of consciousness, weakness and headaches.    Endo/Heme/Allergies: Does not bruise/bleed easily.   Psychiatric/Behavioral: Negative for depression, substance abuse and suicidal ideas.      Physical Exam  Laboratory/Imaging   Hemodynamics  Temp (24hrs), Av.4 °C (97.6 °F), Min:36.3 °C (97.3 °F), Max:36.6 °C (97.8 °F)   Temperature: 36.3 °C (97.3 °F)  Pulse  Av.8  Min: 49  Max: 80    Blood Pressure : 160/68      Respiratory      Respiration: 18, Pulse Oximetry: 93 %        RUL Breath Sounds: Clear, RML Breath Sounds: Diminished, RLL Breath Sounds: Diminished, SHIRA Breath Sounds: Clear, LLL Breath Sounds: Diminished    Fluids    Intake/Output Summary (Last 24 hours) at 18 1731  Last data filed at 18 1200   Gross per 24 hour   Intake              480 ml   Output             2350 ml   Net            -1870 ml       Nutrition  Orders Placed This Encounter   Procedures   • Diet Order     Standing Status:   Standing     Number of Occurrences:   1     Order Specific Question:   Diet:     Answer:   Regular [1]     Physical Exam   Constitutional: He is oriented to person, place, and time. He appears well-developed and well-nourished. No distress.   HENT:   Head: Normocephalic and atraumatic.   Mouth/Throat: Oropharynx is clear and moist. No oropharyngeal exudate.   Eyes: Conjunctivae and EOM are normal. Pupils are equal, round, and reactive to light. Right eye exhibits no discharge. Left eye exhibits no discharge. No scleral icterus.   Neck: Normal range of motion. Neck supple. No JVD present. No tracheal deviation present. No thyromegaly present.   Cardiovascular: Normal rate, regular rhythm and normal heart sounds.  Exam reveals no gallop and no friction rub.    No murmur heard.  Pulmonary/Chest: Effort normal and breath sounds normal. No respiratory distress. He has no wheezes. He has no rales. He exhibits no tenderness.   Lungs CTA b/l   Abdominal: Soft. Bowel sounds are normal. He exhibits no distension and no mass. There is no tenderness. There  is no rebound and no guarding.   Musculoskeletal: Normal range of motion. He exhibits no edema or tenderness.   TLSO in place.  Rt LE with chronic hyperpigmentation changes from chronic infections.  LLE w/o edema noted or acute pain.   Lymphadenopathy:     He has no cervical adenopathy.   Neurological: He is alert and oriented to person, place, and time. No cranial nerve deficit. He exhibits normal muscle tone.   Skin: Skin is warm and dry. No rash noted. He is not diaphoretic. No erythema.   Psychiatric: He has a normal mood and affect. His behavior is normal. Judgment and thought content normal.   Nursing note and vitals reviewed.      Recent Labs      02/03/18   0536  02/05/18   0226   WBC  8.0  6.7   RBC  4.66*  4.61*   HEMOGLOBIN  14.4  14.1   HEMATOCRIT  45.2  44.2   MCV  97.0  95.9   MCH  30.9  30.6   MCHC  31.9*  31.9*   RDW  48.1  46.9   PLATELETCT  164  194   MPV  10.8  10.5     Recent Labs      02/03/18   0537  02/05/18   0226   SODIUM  141  138   POTASSIUM  4.0  4.2   CHLORIDE  107  104   CO2  25  30   GLUCOSE  110*  101*   BUN  32*  26*   CREATININE  0.88  0.94   CALCIUM  8.6  8.6     Recent Labs      02/02/18   2258  02/03/18   0536  02/03/18   1218   APTT  96.6*  78.4*  71.0*                  Assessment/Plan     * Lumbar compression fracture (CMS-HCC)   Assessment & Plan    dr. Hartman:  recommend TLSO, nonsurgical.   Patient receiving symptomatic management for pain   PT/OT rec SNF  L4 compression fx.          Essential hypertension   Assessment & Plan    2/4:  Added lisinopril 5mg po daily.  2/5 remains elevated, changed lisinopril to norvasc 5mg daily.        Chronic bacterial skin infection   Assessment & Plan    States see ID Dr. Ko and on cefdinir for chronic rt leg infection.  Hyperpigmentation seen RLE likely from repeat infections.  No acute infection seen, restarted home cefdinir.        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    hypercoagulability panel to assess for factor V Leyden deficiency, as well as protein C and protein S deficiency given his family history of blood clots.  2/3:  Discussion of high risk of blood clots given new L4 compression fx, agreeable to Eliquis po.  dc'd heparin drip, normal renal function.  He has had 3 dVTs in his lifetime and now has partial occlusion thrombus of left mid superficial femoral vein.  On bloodless program, states will never start coumadin again, has been on it x3 times for lengthy periods of time.        S/P aortic valve replacement- (present on admission)   Assessment & Plan    Patient is followed by Dr. Collins of cardiology, the patient follow-up as needed once medically stable for discharge.          Quality-Core Measures

## 2018-02-06 NOTE — PROGRESS NOTES
Report received from day RN, and care assumed. Pt A&O x 4. Pt denies N/V at this time. He complains of pain the to med back which is well controlled with 5 mg of oxycodone. Pt is a max person when getting out of bed or ambulating. TSLO is at the bedside. Vital signs reviewed and are WNL. IV assessed and patent, with dressing CDI. Plan is for Pt to discharge to [facility] at 1200 today via ambulance transport. POC discussed with Pt and questions answered. POC includes pain control, rest, up for meals, and discharge to SNF at 1200. Bed is in the lowest position, rails are up, and call light is within reach. Communication board updated and hourly rounding implemented.

## 2018-02-06 NOTE — DISCHARGE SUMMARY
CHIEF COMPLAINT ON ADMISSION  Chief Complaint   Patient presents with   • GLF     5 days prior   • Back Pain     L-4 burst fracture   • Leg Swelling     LLE DVT       CODE STATUS  Full Code    HPI & HOSPITAL COURSE  This is [facility] 84 y[facility] and LLE DVT on heparin drip.  U/S LLE with partial occlusion thrombus LLE which may be chronic.    He is found to have a recent L4 compression fracture with recommendations to continue TLSO use per neurosurgery, Dr. Villanueva.    Due to decreased mobility with fracture and h/o of hypercoagulable state with prior DVT and PE, pt is recommended to continue full dose AC.  Pt is requesting use of Eliquis, which has been initiated.  He has been taken off heparin drip.    Of note, pt does have a chronic bacterial skin infection, f/u outpatient with ID with recommendation to continue Cefdinir.    At this time, pt's pain appears to be controlled and he is ambulating with TLSO and staff.    Therefore, he is discharged in good and stable condition for further post-acute management.     SPECIFIC OUTPATIENT FOLLOW-UP  pcp in 2 days  surgery    DISCHARGE PROBLEM LIST  Principal Problem:    Lumbar compression fracture (CMS-HCC) POA: Unknown  Active Problems:    S/P aortic valve replacement POA: Yes      Overview: 23mm Perimount Dr. Paola Gann, June 2008.    Chronic deep vein thrombosis (DVT) of femoral vein of left lower extremity (CMS-[facility]) POA: Yes    Chronic bacterial skin infection POA: Unknown    Essential hypertension POA: Unknown  Resolved Problems:    * No resolved hospital problems. *      FOLLOW UP  Future Appointments  Date Time Provider Department Center   3/2/2018 10:40 AM Carlos Gongora M.D. [facility] None     [facility] ([facility] POS)  [address] [place] [id number]  [phone]          MEDICATIONS ON DISCHARGE   Dayne Franco   Home Medication Instructions SILVIA:[id number]    Printed  on:02/06/18 0832   Medication Information                      acetaminophen (TYLENOL) 325 MG Tab  Take 2 Tabs by mouth every 6 hours as needed (Mild Pain; (Pain scale 1-3); Temp greater than 100.5 F).             amLODIPine (NORVASC) 5 MG Tab  Take 1 Tab by mouth every day.             apixaban (ELIQUIS) 5mg Tab  Take 2 Tabs by mouth 2 Times a Day.             cefdinir (OMNICEF) 300 MG CAPS  Take 300 mg by mouth 2 times a day.             gabapentin (NEURONTIN) 100 MG Cap  Take 1 Cap by mouth 3 times a day.             oxyCODONE immediate-release (ROXICODONE) 5 MG Tab  Take 1 Tab by mouth every 6 hours as needed (Severe Pain (NRS Pain Scale 7-10; CPOT Pain Scale 6-8)) for up to 3 days.             senna-docusate (PERICOLACE OR SENOKOT S) 8.6-50 MG Tab  Take 2 Tabs by mouth 2 Times a Day.             vitamin D 2000 UNIT Tab  Take 1 Tab by mouth every day.                 DIET  Orders Placed This Encounter   Procedures   • Diet Order     Standing Status:   Standing     Number of Occurrences:   1     Order Specific Question:   Diet:     Answer:   Regular [1]       ACTIVITY  As tolerated and directed by skilled nursing.  Class 3 - comfortable at rest but have symptoms with less-than-ordinary effort.    LINES, DRAINS, AND WOUNDS  This is an automated list. Peripheral IVs will be removed prior to discharge.  PIV Group Right;Anterior Forearm 20g Flexible Catheter;Saline Lock (Active)   Line Secured Taped;Transparent 2/5/2018  9:20 PM   Site Condition / Description Assessed;Patent 2/5/2018  9:20 PM   Dressing Type / Description Occlusive;Transparent;Intact;Scant;Sanguinous 2/5/2018  9:20 PM   Dressing Status Observed 2/5/2018  9:20 PM   Saline Locked Yes 2/5/2018  9:20 PM   Infiltration Grading Used by [facility] 0 2/5/2018  9:20 PM   Phlebitis Scale (Used by [facility]) 0 2/5/2018  9:20 PM   Date Primary Tubing Changed 02/02/18 2/2/2018  9:00 AM                     MENTAL STATUS ON TRANSFER  Level of Consciousness:  Alert  Orientation : Oriented x 4  Speech: Speech Clear    CONSULTATIONS  Neurosurgery  ID    PROCEDURES  None     LABORATORY  Lab Results   Component Value Date/Time    SODIUM 138 02/05/2018 02:26 AM    POTASSIUM 4.2 02/05/2018 02:26 AM    CHLORIDE 104 02/05/2018 02:26 AM    CO2 30 02/05/2018 02:26 AM    GLUCOSE 101 (H) 02/05/2018 02:26 AM    BUN 26 (H) 02/05/2018 02:26 AM    CREATININE 0.94 02/05/2018 02:26 AM    CREATININE 1.1 06/09/2008 04:05 AM        Lab Results   Component Value Date/Time    WBC 6.7 02/05/2018 02:26 AM    HEMOGLOBIN 14.1 02/05/2018 02:26 AM    HEMATOCRIT 44.2 02/05/2018 02:26 AM    PLATELETCT 194 02/05/2018 02:26 AM        Total time of the discharge process exceeds 45 minutes.

## 2018-02-08 LAB — F5 P.R506Q BLD/T QL: ABNORMAL

## 2018-03-15 ENCOUNTER — HOSPITAL ENCOUNTER (OUTPATIENT)
Dept: RADIOLOGY | Facility: MEDICAL CENTER | Age: 83
End: 2018-03-15
Attending: NURSE PRACTITIONER
Payer: OTHER GOVERNMENT

## 2018-03-15 DIAGNOSIS — M48.56XD PATHOLOGIC COMPRESSION FRACTURE OF LUMBAR VERTEBRA, WITH ROUTINE HEALING, SUBSEQUENT ENCOUNTER: ICD-10-CM

## 2018-03-15 PROCEDURE — 72100 X-RAY EXAM L-S SPINE 2/3 VWS: CPT

## 2022-01-01 NOTE — PROGRESS NOTES
Assumed care of patient following shift report. Patient is A&Ox4. He is supine in bed and states his pain is 4/10 for which RN is to medicate. Overall, patient is calm and comfortable. He is in no acute distress. Plan of care and safety precautions reviewed. Bed alarm is on. Call device is within reach.    0 0 0

## 2022-04-06 NOTE — HPI: FULL BODY SKIN EXAMINATION
The patient is a 71y Male complaining of 
How Severe Are Your Spot(S)?: moderate
What Is The Reason For Today's Visit?: Skin Lesions
What Is The Reason For Today's Visit? (Being Monitored For X): concerning skin lesions on an annual basis